# Patient Record
Sex: FEMALE | Race: WHITE | NOT HISPANIC OR LATINO | Employment: FULL TIME | URBAN - METROPOLITAN AREA
[De-identification: names, ages, dates, MRNs, and addresses within clinical notes are randomized per-mention and may not be internally consistent; named-entity substitution may affect disease eponyms.]

---

## 2019-06-28 ENCOUNTER — OFFICE VISIT (OUTPATIENT)
Dept: URGENT CARE | Facility: CLINIC | Age: 34
End: 2019-06-28
Payer: COMMERCIAL

## 2019-06-28 VITALS
WEIGHT: 131 LBS | SYSTOLIC BLOOD PRESSURE: 125 MMHG | OXYGEN SATURATION: 99 % | HEIGHT: 65 IN | RESPIRATION RATE: 16 BRPM | TEMPERATURE: 99 F | DIASTOLIC BLOOD PRESSURE: 84 MMHG | BODY MASS INDEX: 21.83 KG/M2 | HEART RATE: 104 BPM

## 2019-06-28 DIAGNOSIS — T14.8XXA ABRASION: ICD-10-CM

## 2019-06-28 DIAGNOSIS — S71.152A DOG BITE OF LEFT THIGH, INITIAL ENCOUNTER: Primary | ICD-10-CM

## 2019-06-28 DIAGNOSIS — S70.12XA CONTUSION OF LEFT THIGH, INITIAL ENCOUNTER: ICD-10-CM
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
## 2019-06-28 PROCEDURE — 99203 PR OFFICE/OUTPT VISIT, NEW, LEVL III, 30-44 MIN: ICD-10-PCS | Mod: S$GLB,,, | Performed by: EMERGENCY MEDICINE

## 2019-06-28 PROCEDURE — 3008F PR BODY MASS INDEX (BMI) DOCUMENTED: ICD-10-PCS | Mod: CPTII,S$GLB,, | Performed by: EMERGENCY MEDICINE

## 2019-06-28 PROCEDURE — 99203 OFFICE O/P NEW LOW 30 MIN: CPT | Mod: S$GLB,,, | Performed by: EMERGENCY MEDICINE

## 2019-06-28 PROCEDURE — 3008F BODY MASS INDEX DOCD: CPT | Mod: CPTII,S$GLB,, | Performed by: EMERGENCY MEDICINE

## 2019-06-28 RX ORDER — MUPIROCIN 20 MG/G
OINTMENT TOPICAL
Qty: 22 G | Refills: 1 | Status: SHIPPED | OUTPATIENT
Start: 2019-06-28 | End: 2022-11-08 | Stop reason: ALTCHOICE

## 2019-06-28 RX ORDER — AMOXICILLIN AND CLAVULANATE POTASSIUM 875; 125 MG/1; MG/1
1 TABLET, FILM COATED ORAL EVERY 12 HOURS
Qty: 20 TABLET | Refills: 0 | Status: SHIPPED | OUTPATIENT
Start: 2019-06-28 | End: 2019-07-08

## 2019-06-29 NOTE — PROGRESS NOTES
"Subjective:       Patient ID: Patricia Quezada is a 33 y.o. female.    Vitals:  height is 5' 5" (1.651 m) and weight is 59.4 kg (131 lb). Her tympanic temperature is 98.8 °F (37.1 °C). Her blood pressure is 125/84 and her pulse is 104. Her respiration is 16 and oxygen saturation is 99%.     Chief Complaint: Animal Bite (left leg )    Pt states she was out of a walk today and nipped her under her buttock to left leg. Pt is UTD on tetanus     It was a black lab.  It did not cause bleeding.  Did cause significant abrasion and bruising and contusion of the left posterolateral thigh where the bite site was.  Barely broke the skin with abrasions.  Slightly tender.  No other trauma.  Tetanus shot was 2-3 years ago.  Dogs had collars.    Animal Bite    The incident occurred today. There is an injury to the left thigh. The pain is mild. Pertinent negatives include no chest pain, no nausea, no vomiting, no headaches, no light-headedness, no weakness and no cough. Her tetanus status is UTD.       Constitution: Negative for chills, fatigue and fever.   HENT: Negative for congestion and sore throat.    Neck: Negative for painful lymph nodes.   Cardiovascular: Negative for chest pain and leg swelling.   Eyes: Negative for double vision and blurred vision.   Respiratory: Negative for cough and shortness of breath.    Gastrointestinal: Negative for nausea, vomiting and diarrhea.   Genitourinary: Negative for dysuria, frequency, urgency and history of kidney stones.   Musculoskeletal: Negative for joint pain, joint swelling, muscle cramps and muscle ache.   Skin: Negative for color change, pale, rash, erythema and bruising.   Allergic/Immunologic: Negative for seasonal allergies.   Neurological: Negative for dizziness, history of vertigo, light-headedness, passing out and headaches.   Hematologic/Lymphatic: Negative for swollen lymph nodes.   Psychiatric/Behavioral: Negative for nervous/anxious, sleep disturbance and " depression. The patient is not nervous/anxious.        Objective:      Physical Exam   Constitutional: She is oriented to person, place, and time. She appears well-developed and well-nourished.   HENT:   Head: Normocephalic and atraumatic. Head is without abrasion, without contusion and without laceration.   Eyes: Pupils are equal, round, and reactive to light. Conjunctivae, EOM and lids are normal.   Neck: Trachea normal, full passive range of motion without pain and phonation normal. Neck supple.   Cardiovascular: Normal rate, regular rhythm and normal heart sounds.   Pulmonary/Chest: Effort normal and breath sounds normal. No stridor. No respiratory distress.   Musculoskeletal: Normal range of motion.   Neurological: She is alert and oriented to person, place, and time.   Skin: Skin is warm, dry and intact. Capillary refill takes less than 2 seconds. No abrasion, no bruising, no burn, no ecchymosis, no laceration, no lesion and no rash noted. No erythema.   3 cm area of swelling and contusion and some very superficial abrasions and almost puncture wounds from the dog bite to the left posterolateral thigh.  No laceration no deep punctures no bleeding.  Mildly tender to palpation   Psychiatric: She has a normal mood and affect. Her speech is normal. Cognition and memory are normal.   Nursing note and vitals reviewed.      Assessment:       1. Dog bite of left thigh, initial encounter    2. Contusion of left thigh, initial encounter    3. Abrasion        Plan:         Dog bite of left thigh, initial encounter    Contusion of left thigh, initial encounter    Abrasion    Other orders  -     mupirocin (BACTROBAN) 2 % ointment; Apply to affected area 3 times daily  Dispense: 22 g; Refill: 1  -     amoxicillin-clavulanate 875-125mg (AUGMENTIN) 875-125 mg per tablet; Take 1 tablet by mouth every 12 (twelve) hours. for 10 days  Dispense: 20 tablet; Refill: 0          Patient Instructions     Tetanus shot already  up-to-date  Wash the site with antibacterial soap and water like dial soap.  Ibuprofen 600 mg every 6-8 hours as needed for pain  Bactroban ointment 2-3 times daily  If you are having signs of cellulitis or skin infection that is worsening despite treatment regimen above, fill and take the Augmentin prescription provided for you.    Review dog bite sheet  Review contusion sheet  Review abrasion sheet.    Return here for any concerns or problems  Lower Extremity Contusion  You have a contusion (bruise) of a lower extremity (leg, knee, ankle, foot, or toe). Symptoms include pain, swelling, and skin discoloration. No bones are broken. This injury may take from a few days to a few weeks to heal.  During that time, the bruise may change from reddish in color, to purple-blue, to green-yellow, to yellow-brown.  Home care  · Unless another medicine was prescribed, you can take acetaminophen, ibuprofen, or naproxen to control pain. (If you have chronic liver or kidney disease or ever had a stomach ulcer or gastrointestinal bleeding, talk with your doctor before using these medicines.)  · Elevate the injured area to reduce pain and swelling. As much as possible, sit or lie down with the injured area raised about the level of your heart. This is especially important during the first 48 hours.  · Ice the injured area to help reduce pain and swelling. Wrap a cold source (ice pack or ice cubes in a plastic bag) in a thin towel. Apply to the bruised area for 20 minutes every 1 to 2 hours the first day. Continue this 3 to 4 times a day until the pain and swelling goes away.  · If crutches have been advised, do not bear full weight on the injured leg until you can do so without pain. You may return to sports when you are able to put full weight and impact on the injured leg without pain.  Follow up  Follow up with your healthcare provider or our staff as advised. Call if you are not improving within the next 1 to 2 weeks.  When to  seek medical advice   Call your healthcare provider right away if any of these occur:  · Increased pain or swelling  · Foot or toes become cold, blue, numb or tingly  · Signs of infection: Warmth, drainage, or increased redness or pain around the injury  · Inability to move the injured area   · Frequent bruising for unknown reasons  Date Last Reviewed: 2/1/2017 © 2000-2017 [employer]. [address] [place] [address]. All rights reserved. This information is not intended as a substitute for professional medical care. Always follow your healthcare professional's instructions.        Dog Bite  A dog bite can cause a wound deep enough to break the skin. In such cases, the wound is cleaned and sometimes closed. If the wound is closed, it is usually not completely closed. This is so that fluid can drain if the wound becomes infected. Often, wounds will be left open to heal. In addition to wound care, a tetanus shot may be given, if needed.    Home care  · Wash your hands well with soap and warm water before and after caring for the wound. This helps lower the risk of infection.  · Care for the wound as directed. If a dressing was applied to the wound, be sure to change it as directed.  · If the wound bleeds, place a clean, soft cloth on the wound. Then firmly apply pressure until the bleeding stops. This may take up to 5 minutes. Do not release the pressure and look at the wound during this time.  · Most wounds heal within 10 days. But an infection can occur even with proper treatment. So be sure to check the wound daily for signs of infection (see below).  · Antibiotics may be prescribed. These help prevent or treat infection. If youre given antibiotics, take them as directed. Also be sure to complete the medicines.  Rabies prevention  Rabies is a virus that can be carried in certain animals. These can include domestic animals such as dogs and cats. Pets fully vaccinated against rabies (2  shots) are at very low risk of infection. But because human rabies is almost always fatal, any biting pet should be confined for 10 days as an extra precaution. In general, if there is a risk for rabies, the following steps may need to be taken:  · If someones pet dog has bitten you, it should be kept in a secure area for the next 10 days to watch for signs of illness. (If the pet owner wont allow this, contact your local animal control center.) If the dog becomes ill or dies during that time, contact your local animal control center at once so the animal may be tested for rabies. If the dog stays healthy for the next 10 days, there is no danger of rabies in the animal or you.  ¨ If a stray dog bit you, contact your local animal control center. They can give information on capture, quarantine, and animal rabies testing.  ¨ If you cant find the animal that bit you in the next 2 days, and if rabies exists in your area, you may need to receive the rabies vaccine series. Call your healthcare provider right away. Or, return to the emergency department promptly.  ¨ All animal bites should be reported to the local animal control center. If you were not given a form to fill out, you can report this yourself.  Follow-up care  Follow up with your healthcare provider, or as directed.  When to seek medical advice  Call your healthcare provider right away if any of these occur:  · Signs of infection:  ¨ Spreading redness or warmth from the wound  ¨ Increased pain or swelling  ¨ Fever of 100.4ºF (38ºC) or higher, or as directed by your healthcare provider  ¨ Colored fluid or pus draining from the wound  · Signs of rabies infection:  ¨ Headache  ¨ Confusion  ¨ Strange behavior  ¨ Increased salivating and drooling  ¨ Seizure  · Decreased ability to move any body part near the wound  · Bleeding that can't be stopped after 5 minutes of firm pressure  Date Last Reviewed: 3/1/2017  © 1845-1522 [employer]. [address] [place] [address]  [place] [address]. All rights reserved. This information is not intended as a substitute for professional medical care. Always follow your healthcare professional's instructions.        Bruises (Contusions)    A contusion is a bruise. A bruise happens when a blow to your body doesn't break the skin but does break blood vessels beneath the skin. Blood leaking from the broken vessels causes redness and swelling. As it heals, your bruise is likely to turn colors like purple, green, and yellow. This is normal. The bruise should fade in 2 or 3 weeks.  Factors that make you more likely to bruise  Almost everyone bruises now and then. Certain people do bruise more easily than others. You're more prone to bruising as you get older. That's because blood vessels become more fragile with age. You're also more likely to bruise if you have a clotting disorder such as hemophilia or take medications that reduce clotting, including aspirin, coumadin, newer agents.  When to go to the emergency room (ER)  Bruises almost always heal on their own without special treatment. But for some people, a bad bruise can be serious. Seek medical care if you:  · Have a clotting disorder such as hemophilia.  · Have cirrhosis or other serious liver disease.  · Take blood-thinning medications such as warfarin (Coumadin).  What to expect in the ER  A doctor will examine your bruise and ask about any health conditions you have. In some cases, you may have a test to check how well your blood clots. Other treatment will depend on your needs.  Follow-up care  Sometimes a bruise gets worse instead of better. It may become larger and more swollen. This can occur when your body walls off a small pool of blood under the skin (hematoma). In very rare cases, your doctor may need to drain excess blood from the area.  Tip:  Apply an ice pack or bag of frozen peas to a bruise (keep a thin cloth between the cold source and your skin). This can help  reduce redness and swelling.   Date Last Reviewed: 12/1/2016  © 1675-9237 [employer]. [address] [place] [address], [place] [address]. All rights reserved. This information is not intended as a substitute for professional medical care. Always follow your healthcare professional's instructions.

## 2019-06-29 NOTE — PATIENT INSTRUCTIONS
Tetanus shot already up-to-date  Wash the site with antibacterial soap and water like dial soap.  Ibuprofen 600 mg every 6-8 hours as needed for pain  Bactroban ointment 2-3 times daily  If you are having signs of cellulitis or skin infection that is worsening despite treatment regimen above, fill and take the Augmentin prescription provided for you.    Review dog bite sheet  Review contusion sheet  Review abrasion sheet.    Return here for any concerns or problems  Lower Extremity Contusion  You have a contusion (bruise) of a lower extremity (leg, knee, ankle, foot, or toe). Symptoms include pain, swelling, and skin discoloration. No bones are broken. This injury may take from a few days to a few weeks to heal.  During that time, the bruise may change from reddish in color, to purple-blue, to green-yellow, to yellow-brown.  Home care  · Unless another medicine was prescribed, you can take acetaminophen, ibuprofen, or naproxen to control pain. (If you have chronic liver or kidney disease or ever had a stomach ulcer or gastrointestinal bleeding, talk with your doctor before using these medicines.)  · Elevate the injured area to reduce pain and swelling. As much as possible, sit or lie down with the injured area raised about the level of your heart. This is especially important during the first 48 hours.  · Ice the injured area to help reduce pain and swelling. Wrap a cold source (ice pack or ice cubes in a plastic bag) in a thin towel. Apply to the bruised area for 20 minutes every 1 to 2 hours the first day. Continue this 3 to 4 times a day until the pain and swelling goes away.  · If crutches have been advised, do not bear full weight on the injured leg until you can do so without pain. You may return to sports when you are able to put full weight and impact on the injured leg without pain.  Follow up  Follow up with your healthcare provider or our staff as advised. Call if you are not improving within the next 1  to 2 weeks.  When to seek medical advice   Call your healthcare provider right away if any of these occur:  · Increased pain or swelling  · Foot or toes become cold, blue, numb or tingly  · Signs of infection: Warmth, drainage, or increased redness or pain around the injury  · Inability to move the injured area   · Frequent bruising for unknown reasons  Date Last Reviewed: 2/1/2017 © 2000-2017 [employer]. [address] [place] [address], [place] [address]. All rights reserved. This information is not intended as a substitute for professional medical care. Always follow your healthcare professional's instructions.        Dog Bite  A dog bite can cause a wound deep enough to break the skin. In such cases, the wound is cleaned and sometimes closed. If the wound is closed, it is usually not completely closed. This is so that fluid can drain if the wound becomes infected. Often, wounds will be left open to heal. In addition to wound care, a tetanus shot may be given, if needed.    Home care  · Wash your hands well with soap and warm water before and after caring for the wound. This helps lower the risk of infection.  · Care for the wound as directed. If a dressing was applied to the wound, be sure to change it as directed.  · If the wound bleeds, place a clean, soft cloth on the wound. Then firmly apply pressure until the bleeding stops. This may take up to 5 minutes. Do not release the pressure and look at the wound during this time.  · Most wounds heal within 10 days. But an infection can occur even with proper treatment. So be sure to check the wound daily for signs of infection (see below).  · Antibiotics may be prescribed. These help prevent or treat infection. If youre given antibiotics, take them as directed. Also be sure to complete the medicines.  Rabies prevention  Rabies is a virus that can be carried in certain animals. These can include domestic animals such as dogs and cats. Pets fully vaccinated  against rabies (2 shots) are at very low risk of infection. But because human rabies is almost always fatal, any biting pet should be confined for 10 days as an extra precaution. In general, if there is a risk for rabies, the following steps may need to be taken:  · If someones pet dog has bitten you, it should be kept in a secure area for the next 10 days to watch for signs of illness. (If the pet owner wont allow this, contact your local animal control center.) If the dog becomes ill or dies during that time, contact your local animal control center at once so the animal may be tested for rabies. If the dog stays healthy for the next 10 days, there is no danger of rabies in the animal or you.  ¨ If a stray dog bit you, contact your local animal control center. They can give information on capture, quarantine, and animal rabies testing.  ¨ If you cant find the animal that bit you in the next 2 days, and if rabies exists in your area, you may need to receive the rabies vaccine series. Call your healthcare provider right away. Or, return to the emergency department promptly.  ¨ All animal bites should be reported to the local animal control center. If you were not given a form to fill out, you can report this yourself.  Follow-up care  Follow up with your healthcare provider, or as directed.  When to seek medical advice  Call your healthcare provider right away if any of these occur:  · Signs of infection:  ¨ Spreading redness or warmth from the wound  ¨ Increased pain or swelling  ¨ Fever of 100.4ºF (38ºC) or higher, or as directed by your healthcare provider  ¨ Colored fluid or pus draining from the wound  · Signs of rabies infection:  ¨ Headache  ¨ Confusion  ¨ Strange behavior  ¨ Increased salivating and drooling  ¨ Seizure  · Decreased ability to move any body part near the wound  · Bleeding that can't be stopped after 5 minutes of firm pressure  Date Last Reviewed: 3/1/2017  © 0525-2936 [employer] [address] [place] [address]. All rights reserved. This information is not intended as a substitute for professional medical care. Always follow your healthcare professional's instructions.        Bruises (Contusions)    A contusion is a bruise. A bruise happens when a blow to your body doesn't break the skin but does break blood vessels beneath the skin. Blood leaking from the broken vessels causes redness and swelling. As it heals, your bruise is likely to turn colors like purple, green, and yellow. This is normal. The bruise should fade in 2 or 3 weeks.  Factors that make you more likely to bruise  Almost everyone bruises now and then. Certain people do bruise more easily than others. You're more prone to bruising as you get older. That's because blood vessels become more fragile with age. You're also more likely to bruise if you have a clotting disorder such as hemophilia or take medications that reduce clotting, including aspirin, coumadin, newer agents.  When to go to the emergency room (ER)  Bruises almost always heal on their own without special treatment. But for some people, a bad bruise can be serious. Seek medical care if you:  · Have a clotting disorder such as hemophilia.  · Have cirrhosis or other serious liver disease.  · Take blood-thinning medications such as warfarin (Coumadin).  What to expect in the ER  A doctor will examine your bruise and ask about any health conditions you have. In some cases, you may have a test to check how well your blood clots. Other treatment will depend on your needs.  Follow-up care  Sometimes a bruise gets worse instead of better. It may become larger and more swollen. This can occur when your body walls off a small pool of blood under the skin (hematoma). In very rare cases, your doctor may need to drain excess blood from the area.  Tip:  Apply an ice pack or bag of frozen peas to a bruise (keep a thin cloth between the cold source and your skin).  This can help reduce redness and swelling.   Date Last Reviewed: 12/1/2016  © 6745-9642 [employer]. [address] [place] [address], [place] [address]. All rights reserved. This information is not intended as a substitute for professional medical care. Always follow your healthcare professional's instructions.

## 2020-11-16 ENCOUNTER — OFFICE VISIT (OUTPATIENT)
Dept: URGENT CARE | Facility: CLINIC | Age: 35
End: 2020-11-16
Payer: COMMERCIAL

## 2020-11-16 VITALS
RESPIRATION RATE: 18 BRPM | HEIGHT: 65 IN | BODY MASS INDEX: 21.66 KG/M2 | TEMPERATURE: 99 F | DIASTOLIC BLOOD PRESSURE: 80 MMHG | HEART RATE: 66 BPM | WEIGHT: 130 LBS | SYSTOLIC BLOOD PRESSURE: 122 MMHG | OXYGEN SATURATION: 98 %

## 2020-11-16 DIAGNOSIS — S16.1XXA STRAIN OF NECK MUSCLE, INITIAL ENCOUNTER: ICD-10-CM

## 2020-11-16 DIAGNOSIS — S46.912A STRAIN OF LEFT SHOULDER, INITIAL ENCOUNTER: ICD-10-CM
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
## 2020-11-16 PROCEDURE — 3008F PR BODY MASS INDEX (BMI) DOCUMENTED: ICD-10-PCS | Mod: CPTII,S$GLB,, | Performed by: EMERGENCY MEDICINE

## 2020-11-16 PROCEDURE — 99214 PR OFFICE/OUTPT VISIT, EST, LEVL IV, 30-39 MIN: ICD-10-PCS | Mod: S$GLB,,, | Performed by: EMERGENCY MEDICINE

## 2020-11-16 PROCEDURE — 3008F BODY MASS INDEX DOCD: CPT | Mod: CPTII,S$GLB,, | Performed by: EMERGENCY MEDICINE

## 2020-11-16 PROCEDURE — 99214 OFFICE O/P EST MOD 30 MIN: CPT | Mod: S$GLB,,, | Performed by: EMERGENCY MEDICINE

## 2020-11-16 RX ORDER — METHOCARBAMOL 500 MG/1
1000 TABLET, FILM COATED ORAL 4 TIMES DAILY
Qty: 56 TABLET | Refills: 0 | Status: SHIPPED | OUTPATIENT
Start: 2020-11-16 | End: 2020-11-23
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
## 2020-11-16 NOTE — LETTER
November 16, 2020      [facility] [address] [place]  [facility] [address]  Phone: [phone]  Fax: [phone]       Patient: Patricia Quezada   YOB: 1985  Date of Visit: 11/16/2020    To Whom It May Concern:    Ervin Quezada  was at [facility] on 11/16/2020. She may return to work/school on 11/17/20 with restrictions.     No Lifting > 10 pounds, Bending, or Straining for the next 7 days      If you have any questions or concerns, or if I can be of further assistance, please do not hesitate to contact me.    Sincerely,    Albert Layne III, MD

## 2021-04-16 ENCOUNTER — PATIENT MESSAGE (OUTPATIENT)
Dept: RESEARCH | Facility: HOSPITAL | Age: 36
End: 2021-04-16

## 2022-11-08 ENCOUNTER — OFFICE VISIT (OUTPATIENT)
Dept: PRIMARY CARE CLINIC | Facility: CLINIC | Age: 37
End: 2022-11-08
Payer: COMMERCIAL

## 2022-11-08 VITALS
BODY MASS INDEX: 24.68 KG/M2 | OXYGEN SATURATION: 97 % | DIASTOLIC BLOOD PRESSURE: 74 MMHG | WEIGHT: 148.13 LBS | SYSTOLIC BLOOD PRESSURE: 120 MMHG | HEART RATE: 84 BPM | HEIGHT: 65 IN | TEMPERATURE: 99 F

## 2022-11-08 DIAGNOSIS — Z12.4 PAP SMEAR FOR CERVICAL CANCER SCREENING: Primary | ICD-10-CM

## 2022-11-08 DIAGNOSIS — N63.23 MASS OF LOWER OUTER QUADRANT OF LEFT BREAST: ICD-10-CM

## 2022-11-08 PROCEDURE — 99999 PR PBB SHADOW E&M-EST. PATIENT-LVL IV: ICD-10-PCS | Mod: PBBFAC,,, | Performed by: STUDENT IN AN ORGANIZED HEALTH CARE EDUCATION/TRAINING PROGRAM

## 2022-11-08 PROCEDURE — 3078F PR MOST RECENT DIASTOLIC BLOOD PRESSURE < 80 MM HG: ICD-10-PCS | Mod: CPTII,S$GLB,, | Performed by: STUDENT IN AN ORGANIZED HEALTH CARE EDUCATION/TRAINING PROGRAM

## 2022-11-08 PROCEDURE — 99212 OFFICE O/P EST SF 10 MIN: CPT | Mod: S$GLB,,, | Performed by: STUDENT IN AN ORGANIZED HEALTH CARE EDUCATION/TRAINING PROGRAM

## 2022-11-08 PROCEDURE — 1159F MED LIST DOCD IN RCRD: CPT | Mod: CPTII,S$GLB,, | Performed by: STUDENT IN AN ORGANIZED HEALTH CARE EDUCATION/TRAINING PROGRAM

## 2022-11-08 PROCEDURE — 3008F PR BODY MASS INDEX (BMI) DOCUMENTED: ICD-10-PCS | Mod: CPTII,S$GLB,, | Performed by: STUDENT IN AN ORGANIZED HEALTH CARE EDUCATION/TRAINING PROGRAM

## 2022-11-08 PROCEDURE — 99999 PR PBB SHADOW E&M-EST. PATIENT-LVL IV: CPT | Mod: PBBFAC,,, | Performed by: STUDENT IN AN ORGANIZED HEALTH CARE EDUCATION/TRAINING PROGRAM

## 2022-11-08 PROCEDURE — 3078F DIAST BP <80 MM HG: CPT | Mod: CPTII,S$GLB,, | Performed by: STUDENT IN AN ORGANIZED HEALTH CARE EDUCATION/TRAINING PROGRAM

## 2022-11-08 PROCEDURE — 3008F BODY MASS INDEX DOCD: CPT | Mod: CPTII,S$GLB,, | Performed by: STUDENT IN AN ORGANIZED HEALTH CARE EDUCATION/TRAINING PROGRAM

## 2022-11-08 PROCEDURE — 3074F SYST BP LT 130 MM HG: CPT | Mod: CPTII,S$GLB,, | Performed by: STUDENT IN AN ORGANIZED HEALTH CARE EDUCATION/TRAINING PROGRAM

## 2022-11-08 PROCEDURE — 99212 PR OFFICE/OUTPT VISIT, EST, LEVL II, 10-19 MIN: ICD-10-PCS | Mod: S$GLB,,, | Performed by: STUDENT IN AN ORGANIZED HEALTH CARE EDUCATION/TRAINING PROGRAM

## 2022-11-08 PROCEDURE — 3074F PR MOST RECENT SYSTOLIC BLOOD PRESSURE < 130 MM HG: ICD-10-PCS | Mod: CPTII,S$GLB,, | Performed by: STUDENT IN AN ORGANIZED HEALTH CARE EDUCATION/TRAINING PROGRAM

## 2022-11-08 PROCEDURE — 1159F PR MEDICATION LIST DOCUMENTED IN MEDICAL RECORD: ICD-10-PCS | Mod: CPTII,S$GLB,, | Performed by: STUDENT IN AN ORGANIZED HEALTH CARE EDUCATION/TRAINING PROGRAM

## 2022-11-08 NOTE — PROGRESS NOTES
"  Primary Care  Return/Acute Office Visit - In Person  11/8/2022  Marzena Ndhlovu      HPI    Patient is a 36 y.o.   Patricia Quezada  has a past medical history of ALL (acute lymphoid leukemia) in remission (1990).    Patient presents with   Chief Complaint   Patient presents with    Breast Mass     Right, tender to touch       Patient presenting with left breast mass  First noticed in September and seems to have become smaller. It does not seem to coincide with menstruation. She denies any skin changes or nipple discharge. Patient has no family hx of breast cancer.     Social History     Social History Narrative    Not on file     Patricia Quezada family history includes Diabetes in her father.    Active Medications:  Review of patient's allergies indicates:  No Known Allergies  No current outpatient medications      Review of Systems   All other systems reviewed and are negative.    Vitals:    11/08/22 1552   BP: 120/74   BP Location: Right arm   Pulse: 84   Temp: 98.6 °F (37 °C)   SpO2: 97%   Weight: 67.2 kg (148 lb 2.4 oz)   Height: 5' 5" (1.651 m)       Physical Exam  Chest:   Breasts:     Left: Mass present.            Assessment and Plan     Left breast mass   ~1.5 cm mass palpated RLQ of right breast.   -Mammogram     Screening   Referral to GYN for pap smear    Vaccines   Schedule COVID booster            Orders Placed This Visit  Orders Placed This Encounter   Procedures    Mammo Digital Diagnostic Bilat     Standing Status:   Future     Standing Expiration Date:   11/8/2024     Order Specific Question:   May the Radiologist modify the order per protocol to meet the clinical needs of the patient?     Answer:   Yes     Order Specific Question:   Release to patient     Answer:   Immediate    Ambulatory referral/consult to Gynecology     Standing Status:   Future     Standing Expiration Date:   12/8/2023     Referral Priority:   Routine     Referral Type:   Consultation     Referral Reason: "   Specialty Services Required     Requested Specialty:   Gynecology     Number of Visits Requested:   1           Upcoming Scheduled Appointments and Follow Up:    No future appointments.    Follow Up [facility] (with who? when?): No follow-ups on file.      Extended Emergency Contact Information  Primary Emergency Contact: Taqueria Christensen   [place]  Mobile Phone: [phone]  Relation: Relative      Marzena Bey MD   Attending Physician  Primary Care  11/8/2022 - 4:05 PM    I spent a total of 13 minutes on the day of the visit.This includes face to face time and non-face to face time preparing to see the patient (eg, review of tests), obtaining and/or reviewing separately obtained history, documenting clinical information in the electronic or other health record, independently interpreting results and communicating results to the patient/family/caregiver, or care coordinator.

## 2022-11-09 ENCOUNTER — PATIENT MESSAGE (OUTPATIENT)
Dept: RADIOLOGY | Facility: HOSPITAL | Age: 37
End: 2022-11-09
Payer: COMMERCIAL

## 2022-11-09 ENCOUNTER — TELEPHONE (OUTPATIENT)
Dept: RADIOLOGY | Facility: HOSPITAL | Age: 37
End: 2022-11-09
Payer: COMMERCIAL

## 2022-11-16 ENCOUNTER — HOSPITAL ENCOUNTER (OUTPATIENT)
Dept: RADIOLOGY | Facility: HOSPITAL | Age: 37
Discharge: HOME | End: 2022-11-16
Attending: STUDENT IN AN ORGANIZED HEALTH CARE EDUCATION/TRAINING PROGRAM
Payer: COMMERCIAL

## 2022-11-16 VITALS — HEIGHT: 65 IN | BODY MASS INDEX: 24.99 KG/M2 | WEIGHT: 150 LBS

## 2022-11-16 DIAGNOSIS — N63.23 MASS OF LOWER OUTER QUADRANT OF LEFT BREAST: ICD-10-CM

## 2022-11-16 PROCEDURE — 77066 DX MAMMO INCL CAD BI: CPT | Mod: TC

## 2022-11-16 PROCEDURE — 76642 US BREAST RIGHT LIMITED: ICD-10-PCS | Mod: 26,RT,, | Performed by: RADIOLOGY

## 2022-11-16 PROCEDURE — 77062 MAMMO DIGITAL DIAGNOSTIC BILAT WITH TOMO: ICD-10-PCS | Mod: 26,,, | Performed by: RADIOLOGY

## 2022-11-16 PROCEDURE — 77066 DX MAMMO INCL CAD BI: CPT | Mod: 26,,, | Performed by: RADIOLOGY

## 2022-11-16 PROCEDURE — 76642 ULTRASOUND BREAST LIMITED: CPT | Mod: TC,RT

## 2022-11-16 PROCEDURE — 76642 ULTRASOUND BREAST LIMITED: CPT | Mod: 26,RT,, | Performed by: RADIOLOGY

## 2022-11-16 PROCEDURE — 77066 MAMMO DIGITAL DIAGNOSTIC BILAT WITH TOMO: ICD-10-PCS | Mod: 26,,, | Performed by: RADIOLOGY

## 2022-11-16 PROCEDURE — 77062 BREAST TOMOSYNTHESIS BI: CPT | Mod: 26,,, | Performed by: RADIOLOGY

## 2022-11-17 ENCOUNTER — TELEPHONE (OUTPATIENT)
Dept: RADIOLOGY | Facility: HOSPITAL | Age: 37
End: 2022-11-17
Payer: COMMERCIAL

## 2022-11-17 NOTE — TELEPHONE ENCOUNTER
Spoke with patient. Reviewed breast biopsy procedure and reviewed instructions for breast biopsy. Patient expressed understanding and all questions were answered. Provided patient with my phone number to call for any further concerns or questions.   Patient scheduled breast biopsy at the [facility] on 12/12/2022.

## 2022-12-12 ENCOUNTER — HOSPITAL ENCOUNTER (OUTPATIENT)
Dept: RADIOLOGY | Facility: HOSPITAL | Age: 37
Discharge: HOME | End: 2022-12-12
Attending: STUDENT IN AN ORGANIZED HEALTH CARE EDUCATION/TRAINING PROGRAM
Payer: COMMERCIAL

## 2022-12-12 DIAGNOSIS — R92.8 ABNORMAL FINDING ON BREAST IMAGING: ICD-10-CM

## 2022-12-12 DIAGNOSIS — N63.0 BREAST MASS IN FEMALE: ICD-10-CM

## 2022-12-12 PROCEDURE — 77065 MAMMO DIGITAL DIAGNOSTIC RIGHT: ICD-10-PCS | Mod: 26,RT,, | Performed by: RADIOLOGY

## 2022-12-12 PROCEDURE — 88305 TISSUE EXAM BY PATHOLOGIST: CPT | Performed by: PATHOLOGY

## 2022-12-12 PROCEDURE — 77065 DX MAMMO INCL CAD UNI: CPT | Mod: TC,RT

## 2022-12-12 PROCEDURE — 77065 DX MAMMO INCL CAD UNI: CPT | Mod: 26,RT,, | Performed by: RADIOLOGY

## 2022-12-12 PROCEDURE — 27200937 US BREAST BIOPSY WITH IMAGING 1ST SITE RIGHT

## 2022-12-12 PROCEDURE — 88305 TISSUE EXAM BY PATHOLOGIST: ICD-10-PCS | Mod: 26,,, | Performed by: PATHOLOGY

## 2022-12-12 PROCEDURE — A4648 IMPLANTABLE TISSUE MARKER: HCPCS

## 2022-12-12 PROCEDURE — 25000003 PHARM REV CODE 250: Performed by: STUDENT IN AN ORGANIZED HEALTH CARE EDUCATION/TRAINING PROGRAM

## 2022-12-12 PROCEDURE — 88305 TISSUE EXAM BY PATHOLOGIST: CPT | Mod: 26,,, | Performed by: PATHOLOGY

## 2022-12-12 PROCEDURE — 19083 US BREAST BIOPSY WITH IMAGING 1ST SITE RIGHT: ICD-10-PCS | Mod: RT,,, | Performed by: RADIOLOGY

## 2022-12-12 PROCEDURE — 19083 BX BREAST 1ST LESION US IMAG: CPT | Mod: RT,,, | Performed by: RADIOLOGY

## 2022-12-12 RX ORDER — BUPIVACAINE HYDROCHLORIDE 2.5 MG/ML
10 INJECTION, SOLUTION EPIDURAL; INFILTRATION; INTRACAUDAL ONCE
Status: COMPLETED | OUTPATIENT
Start: 2022-12-12 | End: 2022-12-12

## 2022-12-12 RX ORDER — LIDOCAINE HYDROCHLORIDE 10 MG/ML
5 INJECTION, SOLUTION EPIDURAL; INFILTRATION; INTRACAUDAL; PERINEURAL ONCE
Status: COMPLETED | OUTPATIENT
Start: 2022-12-12 | End: 2022-12-12

## 2022-12-12 RX ADMIN — LIDOCAINE HYDROCHLORIDE 30 MG: 10 INJECTION, SOLUTION EPIDURAL; INFILTRATION; INTRACAUDAL; PERINEURAL at 03:12

## 2022-12-12 RX ADMIN — BUPIVACAINE HYDROCHLORIDE 10 ML: 2.5 INJECTION, SOLUTION EPIDURAL; INFILTRATION; INTRACAUDAL; PERINEURAL at 03:12

## 2022-12-14 LAB
COMMENT: NORMAL
FINAL PATHOLOGIC DIAGNOSIS: NORMAL
GROSS: NORMAL
Lab: NORMAL

## 2022-12-16 ENCOUNTER — TELEPHONE (OUTPATIENT)
Dept: SURGERY | Facility: CLINIC | Age: 37
End: 2022-12-16
Payer: COMMERCIAL

## 2022-12-16 NOTE — TELEPHONE ENCOUNTER
Attempted to contact patient regarding recent breast biopsy. Patient did not answer, message left with my name and direct number for patient to contact back.

## 2022-12-16 NOTE — TELEPHONE ENCOUNTER
Patient called with results of breast biopsy from 12/12/2022,   no atypia/benign, all questions answered, pt advised to follow up in 6 months with repeat imaging per core biopsy protocol.  reviewed biopsy results and results are benign and concordant. Patient verbalized understanding.         ----- Message from Shauna Edmonds MD sent at 12/15/2022  1:00 PM CST -----  Benign and concordant.

## 2022-12-16 NOTE — TELEPHONE ENCOUNTER
Returned patient call, questions answered.      ----- Message from Steffen Burciaga sent at 12/16/2022  3:38 PM CST -----  Contact: [phone]  Pt is calling back from a missed call.  Pt would like a call back at your earliest convenience.  Pt can be reached at. [phone]

## 2023-03-02 ENCOUNTER — PATIENT OUTREACH (OUTPATIENT)
Dept: ADMINISTRATIVE | Facility: HOSPITAL | Age: 38
End: 2023-03-02
Payer: COMMERCIAL

## 2023-03-02 NOTE — PROGRESS NOTES
Health Maintenance Due   Topic Date Due    Hepatitis C Screening  Never done    COVID-19 Vaccine (4 - Booster for Pfizer series) 01/09/2022        Chart review done.   HM updated.   Immunizations reviewed & updated.   Care Everywhere updated.   [facility] reviewed

## 2023-03-06 ENCOUNTER — OFFICE VISIT (OUTPATIENT)
Dept: PRIMARY CARE CLINIC | Facility: CLINIC | Age: 38
End: 2023-03-06
Payer: COMMERCIAL

## 2023-03-06 VITALS
OXYGEN SATURATION: 99 % | SYSTOLIC BLOOD PRESSURE: 120 MMHG | HEART RATE: 101 BPM | WEIGHT: 153.44 LBS | HEIGHT: 65 IN | DIASTOLIC BLOOD PRESSURE: 76 MMHG | TEMPERATURE: 98 F | BODY MASS INDEX: 25.56 KG/M2

## 2023-03-06 DIAGNOSIS — M54.9 UPPER BACK PAIN ON LEFT SIDE: Primary | ICD-10-CM

## 2023-03-06 PROCEDURE — 3078F DIAST BP <80 MM HG: CPT | Mod: CPTII,S$GLB,, | Performed by: STUDENT IN AN ORGANIZED HEALTH CARE EDUCATION/TRAINING PROGRAM

## 2023-03-06 PROCEDURE — 99213 PR OFFICE/OUTPT VISIT, EST, LEVL III, 20-29 MIN: ICD-10-PCS | Mod: S$GLB,,, | Performed by: STUDENT IN AN ORGANIZED HEALTH CARE EDUCATION/TRAINING PROGRAM

## 2023-03-06 PROCEDURE — 3074F SYST BP LT 130 MM HG: CPT | Mod: CPTII,S$GLB,, | Performed by: STUDENT IN AN ORGANIZED HEALTH CARE EDUCATION/TRAINING PROGRAM

## 2023-03-06 PROCEDURE — 99999 PR PBB SHADOW E&M-EST. PATIENT-LVL IV: ICD-10-PCS | Mod: PBBFAC,,, | Performed by: STUDENT IN AN ORGANIZED HEALTH CARE EDUCATION/TRAINING PROGRAM

## 2023-03-06 PROCEDURE — 3078F PR MOST RECENT DIASTOLIC BLOOD PRESSURE < 80 MM HG: ICD-10-PCS | Mod: CPTII,S$GLB,, | Performed by: STUDENT IN AN ORGANIZED HEALTH CARE EDUCATION/TRAINING PROGRAM

## 2023-03-06 PROCEDURE — 3008F BODY MASS INDEX DOCD: CPT | Mod: CPTII,S$GLB,, | Performed by: STUDENT IN AN ORGANIZED HEALTH CARE EDUCATION/TRAINING PROGRAM

## 2023-03-06 PROCEDURE — 1159F MED LIST DOCD IN RCRD: CPT | Mod: CPTII,S$GLB,, | Performed by: STUDENT IN AN ORGANIZED HEALTH CARE EDUCATION/TRAINING PROGRAM

## 2023-03-06 PROCEDURE — 1159F PR MEDICATION LIST DOCUMENTED IN MEDICAL RECORD: ICD-10-PCS | Mod: CPTII,S$GLB,, | Performed by: STUDENT IN AN ORGANIZED HEALTH CARE EDUCATION/TRAINING PROGRAM

## 2023-03-06 PROCEDURE — 99213 OFFICE O/P EST LOW 20 MIN: CPT | Mod: S$GLB,,, | Performed by: STUDENT IN AN ORGANIZED HEALTH CARE EDUCATION/TRAINING PROGRAM

## 2023-03-06 PROCEDURE — 99999 PR PBB SHADOW E&M-EST. PATIENT-LVL IV: CPT | Mod: PBBFAC,,, | Performed by: STUDENT IN AN ORGANIZED HEALTH CARE EDUCATION/TRAINING PROGRAM

## 2023-03-06 PROCEDURE — 3008F PR BODY MASS INDEX (BMI) DOCUMENTED: ICD-10-PCS | Mod: CPTII,S$GLB,, | Performed by: STUDENT IN AN ORGANIZED HEALTH CARE EDUCATION/TRAINING PROGRAM

## 2023-03-06 PROCEDURE — 3074F PR MOST RECENT SYSTOLIC BLOOD PRESSURE < 130 MM HG: ICD-10-PCS | Mod: CPTII,S$GLB,, | Performed by: STUDENT IN AN ORGANIZED HEALTH CARE EDUCATION/TRAINING PROGRAM

## 2023-03-06 NOTE — PROGRESS NOTES
"Primary Care  Return/Acute Office Visit - In Person  3/6/2023  Marzena Ndhlovu      [facility]    Patient is a 37 y.o.   Patricia Quezada  has a past medical history of ALL (acute lymphoid leukemia) in remission (1990).    Patient presents with   Chief Complaint   Patient presents with    Shoulder Pain     LeFT, GREATER AT NIGHT    Arm Pain     Left, 1 week now     Patient reports tightness in left shoulder for the past one week that seems to worsen at night. Pain is starting to radiate to left arm. Did not improve with massage yesterday. She denies any injuries. She has been lifting light weights about 5 lbs.   She has rearranged pillows and flipped mattress. Patient's job is mostly sedentary with sitting and typing most of the day.     Social History     Social History Narrative    Not on file     Patricia Quezada family history includes Diabetes in her father.    Active Medications:  Review of patient's allergies indicates:  No Known Allergies  No current outpatient medications    Review of Systems   All other systems reviewed and are negative.    Vitals:    03/06/23 1356   BP: 120/76   BP Location: Left arm   Pulse: 101   Temp: 98.2 °F (36.8 °C)   SpO2: 99%   Weight: 69.6 kg (153 lb 7 oz)   Height: 5' 5" (1.651 m)       Physical Exam  Vitals reviewed.   Constitutional:       General: She is not in acute distress.  Musculoskeletal:      Cervical back: Tenderness present.        Assessment and Plan     Left sided upper back pain   Likely muscular. Could be related to poor work ergonomics   Recommended continued NSAID use and heat   Patient to try tens-unit   Refer to PT         Orders Placed This Visit  Orders Placed This Encounter   Procedures    Ambulatory referral/consult to Physical/Occupational Therapy     Standing Status:   Future     Standing Expiration Date:   4/6/2024     Referral Priority:   Routine     Referral Type:   Physical Medicine     Referral Reason:   Specialty Services Required     " Number of Visits Requested:   1           Upcoming Scheduled Appointments and Follow Up:    Future Appointments   Date Time Provider Department Center   5/12/2023 10:45 AM Kesha Guzman MD [facility]       Follow Up [facility] Care (with who? when?): No follow-ups on file.      Extended Emergency Contact Information  Primary Emergency Contact: Taqueria Christensen   [place]  Mobile Phone: [phone]  Relation: Relative      Marzena Bey MD   Attending Physician  Primary Care  3/6/2023 - 2:16 PM    I spent a total of 16 minutes on the day of the visit.This includes face to face time and non-face to face time preparing to see the patient (eg, review of tests), obtaining and/or reviewing separately obtained history, documenting clinical information in the electronic or other health record, independently interpreting results and communicating results to the patient/family/caregiver, or care coordinator.

## 2023-03-13 ENCOUNTER — CLINICAL SUPPORT (OUTPATIENT)
Dept: REHABILITATION | Facility: HOSPITAL | Age: 38
End: 2023-03-13
Attending: STUDENT IN AN ORGANIZED HEALTH CARE EDUCATION/TRAINING PROGRAM
Payer: COMMERCIAL

## 2023-03-13 DIAGNOSIS — M54.9 UPPER BACK PAIN ON LEFT SIDE: Primary | ICD-10-CM

## 2023-03-13 DIAGNOSIS — R29.898 SHOULDER WEAKNESS: ICD-10-CM

## 2023-03-13 PROCEDURE — 97140 MANUAL THERAPY 1/> REGIONS: CPT | Mod: PO

## 2023-03-13 PROCEDURE — 97162 PT EVAL MOD COMPLEX 30 MIN: CPT | Mod: PO,97

## 2023-03-13 PROCEDURE — 97110 THERAPEUTIC EXERCISES: CPT | Mod: PO

## 2023-03-13 NOTE — PLAN OF CARE
"[facility]   Physical Therapy Initial Evaluation     Date: 3/13/2023   Name: Patricia Quezada  Clinic Number: [id number]    Therapy Diagnosis:   Encounter Diagnoses   Name Primary?    Upper back pain on left side Yes    Shoulder weakness      Physician: Marzena Bey MD    Physician Orders: PT Eval and Treat   Medical Diagnosis from Referral: M54.9 (ICD-10-CM) - Upper back pain on left side  Evaluation Date: 3/13/2023  Authorization Period Expiration: 12/29/23  Plan of Care Expiration: 6/5/2023  Progress Note Due: 4/10/2023  Visit # / Visits authorized: -/ 20       Precautions: Standard    Time In: 0701  Time Out: 0800  Total Appointment Time (timed & untimed codes): 59 minutes      SUBJECTIVE   Date of onset: 2 weeks ago    History of current condition - Patricia reports: she woke up with a "knot" in her upper back around the left shoulder blade. She states the pain worsened over the next couple of days and has progressed to down the L arm to the elbow. She also states one bout of numbness in right pinky finger that improved with movement. She states that her pain keeps her up at night and has awoken  She states she is mostly a stomach sleeper, however has changed her sleeping position to her back and sides with little relief.     Falls: 0    Imaging, none    Prior Therapy: none  Social History: lives alone with a dog  Occupation: Private school; desk work  Prior Level of Function: IND  Current Level of Function: Noticed weakness on LUE (pt is L handed)    Pain:  Current 4/10, worst 6/10, best 1/10   Location: left shoulder  left shoulder(s)  Description: Aching  Aggravating Factors: Laying and Morning  Easing Factors: massage, heating pad, and rest    Non-Mechanical Origin:  Night Pain?  Yes  Hx of Cancer?  Yes  Trauma?  No  Sudden bowel/bladder changes?  No  Bone Density compromise?  No    Patients goals: To make the pain go away     Medical History:   Past Medical History: "   Diagnosis Date    ALL (acute lymphoid leukemia) in remission 1990    s/p chemo. Did 10 year f/u with oncologist and has been cleared       Surgical History:   Patricia Quezada  has no past surgical history on file.    Medications:   Patricia currently has no medications in their medication list.    Allergies:   Review of patient's allergies indicates:  No Known Allergies       OBJECTIVE     Posture: Slight rounded shoulders with increased thoracic kyphosis in sitting.    Cervical Range of Motion:    Degrees(%) Pain   Flexion WFL None     Extension WFL None     Right Rotation WFL Pulling on L     Left Rotation WFL none     Right Side Bending WFL Pulling on L   Left Side Bending WFL none      Shoulder Range of Motion:   Shoulder Left Right   Flexion WFL WFL   Abduction WFL WFL   ER WFL WFL   IR WFL WFL     Upper Extremity Strength  (R) UE  (L) UE    Shoulder flexion: 4/5 Shoulder flexion: 4/5   Shoulder Abduction: 4/5 Shoulder abduction: 4-/5   Shoulder ER 4/5 Shoulder ER 3+/5   Shoulder IR 4/5 Shoulder IR 4/5   Elbow flexion: 4/5 Elbow flexion: 4/5   Elbow extension: 4/5 Elbow extension: 3+/5   Wrist flexion: 4/5 Wrist flexion: 4/5   Wrist extension: 4/5 Wrist extension: 4/5    69 lbs : 59 lbs   Lower Trap 3+/5 Lower Trap 3-/5   Middle Trap 3/5 Middle Trap 3/5       Special Tests:  Distraction -   Spurlings -   Vertebral Artery -   Lateral Flexion Alar Ligament -   Transverse Ligament -   Shoulder Abduction Test -   Quadrant Testing -     Cervical joint mobility: Decreased lateral mobility on C5-C7 on L eft side       Reflexes:  -Bicep (C5): Intact  -Brachioradialis (C6): Intact  -Tricep (C7): Intact    Thoracic mobility: Hypomobility T2-T7    Palpation: Pain to palpation on L paraspinals and transverse processes and subscapularis      Sensation: Intact    Flexibility: WNL    Neural tension:   -ULTT Median: -  -ULTT Ulnar: -  -ULTT Radial: -        TREATMENT     Total Treatment time (time-based  "codes) separate from Evaluation: 45 minutes     Patricia received therapeutic exercises to develop strength, endurance, ROM, and posture for 30 minutes including:  -2X15 Prone T (1lb for first bout; no weight for second bout)  -2x15 Prone Y   -2x15 Prone Shoulder EXT  -2X15 Prone W  -2x10 Seated scap retraction with 3" hold     *During Prone exercises pt noted with poor elbow extension and required frequent cueing to complete correctly      Patricia received the following manual therapy techniques: Joint mobilizations and Soft tissue Mobilization were applied to the: lower cervical spine (C5-C7) and STM and trigger point release to subscapularis for 15 minutes      PATIENT EDUCATION AND HOME EXERCISES     Education provided:   - HEP    Written Home Exercises Provided: yes. Exercises were reviewed and Patricia was able to demonstrate them prior to the end of the session.  Patricia demonstrated good  understanding of the education provided. See EMR under Patient Instructions for exercises provided during therapy sessions.    ASSESSMENT     Patricia is a 37 y.o. female referred to outpatient Physical Therapy with a medical diagnosis of M54.9 (ICD-10-CM) - Upper back pain on left side. Patient presents with   Medical necessity is demonstrated by the following IMPAIRMENTS/PROBLEMS:  -Decreased function (NDI)   -Increased pain (NPS)   -Decreased pain free cervical AROM   -Decreased UE strength (MMT)   -Impaired sitting posture (Craniocervical angle)  -Decreased participation in regular exercise routine  - (+) TTP: Subscapularis  -poor proprioception of LUE    Patient prognosis is Good.   Patientt will benefit from skilled outpatient Physical Therapy to address the deficits stated above and in the chart below, provide patient /family education, and to maximize patientt's level of independence.     Plan of care discussed with patient: Yes  Patient's spiritual, cultural and educational needs considered and patient is " agreeable to the plan of care and goals as stated below:     Anticipated Barriers for therapy: None    Medical Necessity is demonstrated by the following  History  Co-morbidities and personal factors that may impact the plan of care Co-morbidities:   history of cancer    Personal Factors:   age     moderate   Examination  Body Structures and Functions, activity limitations and participation restrictions that may impact the plan of care Body Regions:   neck  back  upper extremities    Body Systems:    gross symmetry  ROM  strength  gross coordinated movement  motor control    Participation Restrictions:   None    Activity limitations:   Learning and applying knowledge  no deficits    General Tasks and Commands  no deficits    Communication  no deficits    Mobility  lifting and carrying objects    Self care  no deficits    Domestic Life  no deficits    Interactions/Relationships  no deficits    Life Areas  no deficits    Community and Social Life  no deficits         moderate   Clinical Presentation evolving clinical presentation with changing clinical characteristics moderate   Decision Making/ Complexity Score: moderate       GOALS: Short Term Goals: 4 weeks  1.Report decreased L shoulder/scapular pain  <   / =  4  /10  to increase tolerance for with functional reaching  2. Increase cervical ROM by 5-10 degrees in order to perform ADLs with decreased difficulty.  3. Increase strength in B shoulders/scapular stabilizers by 1/3 MMT grade to increase tolerance for ADL and work activities.  4. Pt to tolerate HEP to improve ROM and independence with ADL's    Long Term Goals: 8 weeks  1.Report decreased L shoulder/scapular pain  <   / =  2  /10  to increase tolerance for functional reaching  2. Increase UE/neck flexibility in order to improve posture.    3.Increased strength in B shoulders/scapular stabilizers to >/= 4/5 MMT grade to increase tolerance for ADL and work activities.  4. Increase strength in L elbow extensor  to 4/5 MMT to increased tolerance to reaching and ADLs      PLAN   Plan of care Certification: 3/13/2023 to 6/5/2023.    Outpatient Physical Therapy 2 times weekly for 10 weeks to include the following interventions: Cervical/Lumbar Traction, Moist Heat/ Ice, Neuromuscular Re-ed, Patient Education, Self Care, Therapeutic Activities, and Therapeutic Exercise.     Pt will likely benefit from further imaging of cervical spine to rule-out other pathology, however, pt is still appropriate to be seen for above deficits.     Winter Sosa, PT      I CERTIFY THE NEED FOR THESE SERVICES FURNISHED UNDER THIS PLAN OF TREATMENT AND WHILE UNDER MY CARE   Physician's comments:     Physician's Signature: ___________________________________________________

## 2023-03-14 ENCOUNTER — TELEPHONE (OUTPATIENT)
Dept: PRIMARY CARE CLINIC | Facility: CLINIC | Age: 38
End: 2023-03-14
Payer: COMMERCIAL

## 2023-03-14 ENCOUNTER — HOSPITAL ENCOUNTER (OUTPATIENT)
Dept: RADIOLOGY | Facility: HOSPITAL | Age: 38
Discharge: HOME | End: 2023-03-14
Attending: STUDENT IN AN ORGANIZED HEALTH CARE EDUCATION/TRAINING PROGRAM
Payer: COMMERCIAL

## 2023-03-14 DIAGNOSIS — M54.9 UPPER BACK PAIN ON LEFT SIDE: ICD-10-CM

## 2023-03-14 DIAGNOSIS — M54.9 UPPER BACK PAIN ON LEFT SIDE: Primary | ICD-10-CM

## 2023-03-14 DIAGNOSIS — R29.898 SHOULDER WEAKNESS: ICD-10-CM

## 2023-03-14 PROCEDURE — 72040 X-RAY EXAM NECK SPINE 2-3 VW: CPT | Mod: TC,PN

## 2023-03-14 PROCEDURE — 72040 XR CERVICAL SPINE AP LATERAL: ICD-10-PCS | Mod: 26,,, | Performed by: RADIOLOGY

## 2023-03-14 PROCEDURE — 72040 X-RAY EXAM NECK SPINE 2-3 VW: CPT | Mod: 26,,, | Performed by: RADIOLOGY

## 2023-03-14 NOTE — TELEPHONE ENCOUNTER
----- Message from Marzena Bey MD sent at 3/14/2023  9:31 AM CDT -----  Please schedule patient for xray per physical therapy recommendation

## 2023-03-16 ENCOUNTER — CLINICAL SUPPORT (OUTPATIENT)
Dept: REHABILITATION | Facility: HOSPITAL | Age: 38
End: 2023-03-16
Payer: COMMERCIAL

## 2023-03-16 DIAGNOSIS — M54.9 UPPER BACK PAIN ON LEFT SIDE: Primary | ICD-10-CM

## 2023-03-16 DIAGNOSIS — R29.898 SHOULDER WEAKNESS: ICD-10-CM

## 2023-03-16 PROCEDURE — 97140 MANUAL THERAPY 1/> REGIONS: CPT | Mod: PO,CQ,97

## 2023-03-16 PROCEDURE — 97110 THERAPEUTIC EXERCISES: CPT | Mod: PO,CQ

## 2023-03-16 PROCEDURE — 97112 NEUROMUSCULAR REEDUCATION: CPT | Mod: PO,CQ,97

## 2023-03-16 NOTE — PROGRESS NOTES
"  [facility]   Physical Therapy Treatment Note     Name: Patricia Mireles [place]  Clinic Number: [id number]    Therapy Diagnosis:   Encounter Diagnoses   Name Primary?    Upper back pain on left side Yes    Shoulder weakness      Physician: Marzena Bey MD    Visit Date: 3/16/2023    Physician Orders: PT Eval and Treat   Medical Diagnosis from Referral: M54.9 (ICD-10-CM) - Upper back pain on left side  Evaluation Date: 3/13/2023  Authorization Period Expiration: 12/29/23  Plan of Care Expiration: 6/5/2023  Progress Note Due: 4/10/2023  Visit # / Visits authorized: 1/ 20      Precautions: Standard    PTA Visit #: 1/5     Time In: 0705  Time Out: 0756  Total Billable Time: 51 minutes    SUBJECTIVE     Pt reports: her shoulder is feeling better, even sleeping a little bit better, but still has pain into the upper arm.   She was compliant with home exercise program.  Response to previous treatment: no complaints  Functional change: sleeping better    Pain: -/10 (not asked)  Location: left shoulder      OBJECTIVE     Objective Measures updated at progress report unless specified.     TREATMENT     Patricia received the treatments listed below:      therapeutic exercises to develop strength, endurance, flexibility, and posture for 23 minutes including:   -UBE 6' (3/3)  -2x15 Prone T   -2x15 Prone Y   -2x15 Prone Shoulder EXT  -2x10 Seated scap retraction with 3" hold  -3x20" hold UT stretch  -3x20" hold LS stretch  -2x10 vs RTB resisted row     neuromuscular re-education activities to improve: Coordination, Sense, and Rotator Cuff stability for 15 minutes. The following activities were included:  -2x10 vs 1# Supine Skull   -2x10 vs 1# S/L ER    -2x10 vs 1# S/L abduction   -2x15 Prone W  -2x10 vs YTB bilateral shoulder ER w/ scap retract    manual therapy techniques: Joint mobilizations and Soft tissue Mobilization were applied to the: lower cervical spine (C5-C7) and STM and trigger " point release to subscapularis for 13 minutes    Patient Education and Home Exercises     Home Exercises Provided and Patient Education Provided     Education provided:   - Exercise rationale  - Demonstrate and cuing for all exericse  - HEP review/update    Written Home Exercises Provided: yes. Exercises were reviewed and Patricia was able to demonstrate them prior to the end of the session.  Patricia demonstrated good  understanding of the education provided. See EMR under Patient Instructions for exercises provided during therapy sessions    ASSESSMENT     Patient tolerates her program and progressions well this date and denies any adverse affects or increase in symptoms upon completion of her visit. She demonstrates significant weakness through the LUE which contributes to her functional deficits in which she would benefit from continued skilled treatment.     Patricia Is progressing well towards her goals.   Pt prognosis is Good.     Pt will continue to benefit from skilled outpatient physical therapy to address the deficits listed in the problem list box on initial evaluation, provide pt/family education and to maximize pt's level of independence in the home and community environment.     Pt's spiritual, cultural and educational needs considered and pt agreeable to plan of care and goals.     Anticipated barriers to physical therapy: none    Goals:   Short Term Goals: 4 weeks  1.Report decreased L shoulder/scapular pain  <   / =  4  /10  to increase tolerance for with functional reaching  2. Increase cervical ROM by 5-10 degrees in order to perform ADLs with decreased difficulty.  3. Increase strength in B shoulders/scapular stabilizers by 1/3 MMT grade to increase tolerance for ADL and work activities.  4. Pt to tolerate HEP to improve ROM and independence with ADL's     Long Term Goals: 8 weeks  1.Report decreased L shoulder/scapular pain  <   / =  2  /10  to increase tolerance for functional reaching  2.  Increase UE/neck flexibility in order to improve posture.    3.Increased strength in B shoulders/scapular stabilizers to >/= 4/5 MMT grade to increase tolerance for ADL and work activities.  4. Increase strength in L elbow extensor to 4/5 MMT to increased tolerance to reaching and ADLs    PLAN   Plan of care Certification: 3/13/2023 to 6/5/2023.    Continue Outpatient Physical Therapy per patient tolerance and POC.     Carley Butterfield, PTA

## 2023-03-20 ENCOUNTER — CLINICAL SUPPORT (OUTPATIENT)
Dept: REHABILITATION | Facility: HOSPITAL | Age: 38
End: 2023-03-20
Payer: COMMERCIAL

## 2023-03-20 DIAGNOSIS — M54.9 UPPER BACK PAIN ON LEFT SIDE: Primary | ICD-10-CM

## 2023-03-20 DIAGNOSIS — R29.898 SHOULDER WEAKNESS: ICD-10-CM

## 2023-03-20 PROCEDURE — 97140 MANUAL THERAPY 1/> REGIONS: CPT | Mod: PO

## 2023-03-20 PROCEDURE — 97110 THERAPEUTIC EXERCISES: CPT | Mod: PO,97

## 2023-03-20 PROCEDURE — 97112 NEUROMUSCULAR REEDUCATION: CPT | Mod: PO,97

## 2023-03-20 NOTE — PROGRESS NOTES
"  [facility]   Physical Therapy Treatment Note     Name: Patricia Mireles [place]  Clinic Number: [id number]    Therapy Diagnosis:   Encounter Diagnoses   Name Primary?    Upper back pain on left side Yes    Shoulder weakness        Physician: Marzena Bey MD    Visit Date: 3/20/2023    Physician Orders: PT Eval and Treat   Medical Diagnosis from Referral: M54.9 (ICD-10-CM) - Upper back pain on left side  Evaluation Date: 3/13/2023  Authorization Period Expiration: 12/29/23  Plan of Care Expiration: 6/5/2023  Progress Note Due: 4/10/2023  Visit # / Visits authorized: 1/ 20      Precautions: Standard    PTA Visit #: 1/5     Time In: 0702  Time Out: 0758  Total Billable Time: 56 minutes    SUBJECTIVE     Pt reports: "The pain in upper back and shoulder is better, but I still have pain in my elbow."  She was compliant with home exercise program.  Response to previous treatment: improved pain  Functional change: sleeping better    Pain: 3/10  Location: left elbow      OBJECTIVE     Objective Measures updated at progress report unless specified.     TREATMENT     Patricia received the treatments listed below:      therapeutic exercises to develop strength, endurance, flexibility, and posture for 30 minutes including:   -UBE 6' (3/3)  -2x15 Standing T vs RTB   -2x15 Standing Y vs YTB  -2x15 Standing Shoulder EXT vs GTB on first; RTB on second  -2x15 vs RTB standing resisted row  -2x10 ISO Shoulder ER/IR walkouts x2 steps s RTB each way  -2X10 Wall angels  -2x10 Wall press with roller  -3x20" hold UT stretch  -3x20" hold LS stretch       neuromuscular re-education activities to improve: Coordination, Sense, and Rotator Cuff stability for 15 minutes. The following activities were included:\   -2x15 Supine shoulder protraction vs 1#  -2x15 vs 1# Supine Skull   -2x15vs 1# open books  -2x10 vs 1# S/L ER    -2X60" PNF D1/D2 with graded resistance from therapist   -2x10 Subscap Liftoff " prone    manual therapy techniques: Joint mobilizations and Soft tissue Mobilization were applied to the: lower cervical spine (C5-C7) and STM and trigger point release to subscapularis for 11 minutes    Patient Education and Home Exercises     Home Exercises Provided and Patient Education Provided     Education provided:   - Exercise rationale  - Demonstrate and cuing for all exericse  - HEP review/update    Written Home Exercises Provided: yes. Exercises were reviewed and Patricia was able to demonstrate them prior to the end of the session.  Patricia demonstrated good  understanding of the education provided. See EMR under Patient Instructions for exercises provided during therapy sessions    ASSESSMENT     Patient progressing well with therex with improved strength in LUE elbow extensors. Pt continues to be limited by decreased scapular stabilization and decreased strength in L shoulder and LUE.    Patricia Is progressing well towards her goals.   Pt prognosis is Good.     Pt will continue to benefit from skilled outpatient physical therapy to address the deficits listed in the problem list box on initial evaluation, provide pt/family education and to maximize pt's level of independence in the home and community environment.     Pt's spiritual, cultural and educational needs considered and pt agreeable to plan of care and goals.     Anticipated barriers to physical therapy: none    Goals:   Short Term Goals: 4 weeks  1.Report decreased L shoulder/scapular pain  <   / =  4  /10  to increase tolerance for with functional reaching  2. Increase cervical ROM by 5-10 degrees in order to perform ADLs with decreased difficulty.  3. Increase strength in B shoulders/scapular stabilizers by 1/3 MMT grade to increase tolerance for ADL and work activities.  4. Pt to tolerate HEP to improve ROM and independence with ADL's     Long Term Goals: 8 weeks  1.Report decreased L shoulder/scapular pain  <   / =  2  /10  to  increase tolerance for functional reaching  2. Increase UE/neck flexibility in order to improve posture.    3.Increased strength in B shoulders/scapular stabilizers to >/= 4/5 MMT grade to increase tolerance for ADL and work activities.  4. Increase strength in L elbow extensor to 4/5 MMT to increased tolerance to reaching and ADLs    PLAN   Plan of care Certification: 3/13/2023 to 6/5/2023.    Continue Outpatient Physical Therapy per patient tolerance and POC.     Winter Sosa, PT

## 2023-03-23 ENCOUNTER — CLINICAL SUPPORT (OUTPATIENT)
Dept: REHABILITATION | Facility: HOSPITAL | Age: 38
End: 2023-03-23
Payer: COMMERCIAL

## 2023-03-23 DIAGNOSIS — M54.9 UPPER BACK PAIN ON LEFT SIDE: Primary | ICD-10-CM

## 2023-03-23 DIAGNOSIS — R29.898 SHOULDER WEAKNESS: ICD-10-CM

## 2023-03-23 PROCEDURE — 97140 MANUAL THERAPY 1/> REGIONS: CPT | Mod: PO,CQ

## 2023-03-23 PROCEDURE — 97110 THERAPEUTIC EXERCISES: CPT | Mod: PO,CQ

## 2023-03-23 NOTE — PROGRESS NOTES
"  [facility]   Physical Therapy Treatment Note     Name: Patricia Mireles [place]  Clinic Number: [id number]    Therapy Diagnosis:   Encounter Diagnoses   Name Primary?    Upper back pain on left side Yes    Shoulder weakness      Physician: Marzena Bey MD    Visit Date: 3/23/2023    Physician Orders: PT Eval and Treat   Medical Diagnosis from Referral: M54.9 (ICD-10-CM) - Upper back pain on left side  Evaluation Date: 3/13/2023  Authorization Period Expiration: 12/29/23  Plan of Care Expiration: 6/5/2023  Progress Note Due: 4/10/2023  Visit # / Visits authorized: 1/ 20      Precautions: Standard    PTA Visit #: 1/5     Time In: 0702  Time Out: 0757  Total Billable Time: 55 minutes    SUBJECTIVE     Pt reports: she has been extremely painful since her last visit. She is feeling this through the neck, back, and whole LUE. She has had sleepless nights, increased tingling into the left hand, using heat/hot baths with mild short term relief.    She was not able to be compliant with home exercise program due to painful attempts.  Response to previous treatment: improved pain  Functional change: sleeping better    Pain: 8/10  Location: left elbow      OBJECTIVE     Objective Measures updated at progress report unless specified.     TREATMENT     Patricia received the treatments listed below:    Bold = completed    therapeutic exercises to develop strength, endurance, flexibility, and posture for 38 minutes including:   -UBE 6' (3/3)  -2x15 Prone T   -2x15 Prone Y   -2x15 Prone Shoulder EXT  -2x10 Seated scap retraction with 3" hold  -2x10 Seated chin tuck with 3" hold  -3x30" hold UT stretch  -3x30" hold LS stretch  -10x5" hold ea side open book stretch  -2x15 vs RTB standing resisted row  -2x15 Standing Shoulder EXT vs GTB on first; RTB on second  -2x10 ISO Shoulder ER/IR walkouts x2 steps s RTB each way  -2X10 Wall angels  -2x10 Serratus wall slide with foam roller     neuromuscular " re-education activities to improve: Coordination, Sense, and Rotator Cuff stability for 05 minutes. The following activities were included:  -2x10 vs 1# Supine Skull   -2x10 vs 1# S/L ER    -2x10 vs 1# S/L abduction   -2x15 Prone W  -2x10 vs YTB bilateral shoulder ER w/ scap retract   -2x15 vs 1# Supine serratus punch   -2x10 Subscap Liftoff prone    manual therapy techniques: Joint mobilizations and Soft tissue Mobilization were applied to the: lower cervical spine for 12 minutes   -(C5-C7) and STM and trigger point release to subscapularis (not performed)   -STM to cervical paraspinals, UT, LS   -Scapular mobilizations    -Passive radial nerve glide     Patient Education and Home Exercises     Home Exercises Provided and Patient Education Provided     Education provided:   - Exercise rationale  - Demonstrate and cuing for all exericse  - HEP review/update    Written Home Exercises Provided: Patient instructed to cont prior HEP. Exercises were reviewed and Patricia was able to demonstrate them prior to the end of the session.  Patricia demonstrated good  understanding of the education provided. See EMR under Patient Instructions for exercises provided during therapy sessions    ASSESSMENT     Completed significantly modified session this date due to pt's increase in pain. She presents with minimal improvement in symptoms upon completion of her session as we perform gentle stretching and postural strengthening/reeducation. Provided pt with education regarding completion of her HEP and use of modalities to address increased symptoms to allow for a hopeful return to her prior level of function.     Patricia is progressing well towards her goals.   Pt prognosis is Good.     Pt will continue to benefit from skilled outpatient physical therapy to address the deficits listed in the problem list box on initial evaluation, provide pt/family education and to maximize pt's level of independence in the home and  community environment.     Pt's spiritual, cultural and educational needs considered and pt agreeable to plan of care and goals.     Anticipated barriers to physical therapy: none    Goals:   Short Term Goals: 4 weeks  1.Report decreased L shoulder/scapular pain  <   / =  4  /10  to increase tolerance for with functional reaching  2. Increase cervical ROM by 5-10 degrees in order to perform ADLs with decreased difficulty.  3. Increase strength in B shoulders/scapular stabilizers by 1/3 MMT grade to increase tolerance for ADL and work activities.  4. Pt to tolerate HEP to improve ROM and independence with ADL's     Long Term Goals: 8 weeks  1.Report decreased L shoulder/scapular pain  <   / =  2  /10  to increase tolerance for functional reaching  2. Increase UE/neck flexibility in order to improve posture.    3.Increased strength in B shoulders/scapular stabilizers to >/= 4/5 MMT grade to increase tolerance for ADL and work activities.  4. Increase strength in L elbow extensor to 4/5 MMT to increased tolerance to reaching and ADLs    PLAN   Plan of care Certification: 3/13/2023 to 6/5/2023.    Continue Outpatient Physical Therapy per patient tolerance and POC.     Carley Butterfield, PTA

## 2023-03-27 ENCOUNTER — CLINICAL SUPPORT (OUTPATIENT)
Dept: REHABILITATION | Facility: HOSPITAL | Age: 38
End: 2023-03-27
Payer: COMMERCIAL

## 2023-03-27 DIAGNOSIS — M54.9 UPPER BACK PAIN ON LEFT SIDE: Primary | ICD-10-CM

## 2023-03-27 DIAGNOSIS — R29.898 SHOULDER WEAKNESS: ICD-10-CM

## 2023-03-27 PROCEDURE — 97112 NEUROMUSCULAR REEDUCATION: CPT | Mod: PO,97

## 2023-03-27 PROCEDURE — 97530 THERAPEUTIC ACTIVITIES: CPT | Mod: PO

## 2023-03-27 PROCEDURE — 97110 THERAPEUTIC EXERCISES: CPT | Mod: PO

## 2023-03-27 NOTE — PROGRESS NOTES
" [facility]   Physical Therapy Treatment Note     Name: Patricia Mireles [place]  Clinic Number: [id number]    Therapy Diagnosis:   Encounter Diagnoses   Name Primary?    Upper back pain on left side Yes    Shoulder weakness      Physician: Marzena Bey MD    Visit Date: 3/27/2023    Physician Orders: PT Eval and Treat   Medical Diagnosis from Referral: M54.9 (ICD-10-CM) - Upper back pain on left side  Evaluation Date: 3/13/2023  Authorization Period Expiration: 12/29/23  Plan of Care Expiration: 6/5/2023  Progress Note Due: 4/10/2023  Visit # / Visits authorized: 4/20  Precautions: Standard  PTA Visit #: 0/5    Time In: 8:05 am  Time Out: 9:00 am  Total Billable Time: 55 minutes    SUBJECTIVE     Patient reports she has been having some intermittent tingling/numbness in her left hand this morning, and reports 2/10 tingling to her left elbow in standing upon arrival.    She was partially compliant with home exercise program due to painful attempts.  Response to previous treatment: improved pain  Functional change: sleeping better    Pain: 2/10 tingling  Location: left elbow    OBJECTIVE     Objective Measures updated at progress report unless specified.    TREATMENT     Patricia received the treatments listed below:    Bold = completed    Therapeutic Activities to improve functional performance for 15 minutes, including:  -Upper Body Ergometer, 8 minutes (forwards/backwards), level 1.0  -Patient Education (See Below)    Therapeutic Exercise to develop strength, endurance, flexibility, and posture for 25 minutes including:   -3x10 with 3" hold, Seated chin tuck with self/clinician overpressure   -3x10 with 3" hold, Seated chin tuck with upper cervical nods (abolished left arm paresthesias)   -2x15 Seated scap retraction with 3" hold  -3x30" hold, Upper trapezius stretch  -3x30" hold, Levator scapula stretch  -10x5" hold ea side open book stretch  -2x15 vs RTB standing resisted " row  -2x15 Standing Shoulder EXT vs GTB on first; RTB on second  -2x10 ISO Shoulder ER/IR walkouts x2 steps s RTB each way  -2X10 Wall angels  -2x10 Serratus wall slide with foam roller     Neuromuscular Re-Education activities to improve: Coordination, Sense, and Rotator Cuff stability for 15 minutes. The following activities were included:   -3x15 vs 2# Supine serratus punch   -3x15 vs 2# Side lying external rotation   -3x30 vs YTB Supine Y's  -2x10 vs 1# Supine Skull   -2x10 vs 1# S/L ER    -2x10 vs 1# S/L abduction   -2x15 Prone W  -2x10 vs YTB bilateral shoulder ER w/ scap retract    manual therapy techniques: Joint mobilizations and Soft tissue Mobilization were applied to the: lower cervical spine for 0 minutes   -(C5-C7) and STM and trigger point release to subscapularis (not performed)   -STM to cervical paraspinals, UT, LS   -Scapular mobilizations    -Passive radial nerve glide     Patient Education and Home Exercises     Home Exercises Provided and Patient Education Provided     Education provided:   - Exercise rationale  - Demonstrate and cuing for all exericse  - HEP review/update    Written Home Exercises Provided: Patient instructed to cont prior HEP. Exercises were reviewed and Patricia was able to demonstrate them prior to the end of the session.  Patricia demonstrated good  understanding of the education provided. See EMR under Patient Instructions for exercises provided during therapy sessions    ASSESSMENT     Patient presenting with right elbow and hand paresthesias upon arrival to session, which were abolished with upper cervical flexion nods periodically throughout the session. Progressed konstantin-scapular, rotator cuff, and postural muscle strengthening per patient tolerance which she tolerated well. Instructed patient to add upper cervical flexion nods to HEP as reductive exercise whenever she is experiencing parthesias, which she verbalized understanding of.    Patricia is  progressing well towards her goals.   Pt prognosis is Good.     Pt will continue to benefit from skilled outpatient physical therapy to address the deficits listed in the problem list box on initial evaluation, provide pt/family education and to maximize pt's level of independence in the home and community environment.     Pt's spiritual, cultural and educational needs considered and pt agreeable to plan of care and goals.     Anticipated barriers to physical therapy: none    Goals:   Short Term Goals: 4 weeks  1.Report decreased L shoulder/scapular pain  <   / =  4  /10  to increase tolerance for with functional reaching  2. Increase cervical ROM by 5-10 degrees in order to perform ADLs with decreased difficulty.  3. Increase strength in B shoulders/scapular stabilizers by 1/3 MMT grade to increase tolerance for ADL and work activities.  4. Pt to tolerate HEP to improve ROM and independence with ADL's     Long Term Goals: 8 weeks  1.Report decreased L shoulder/scapular pain  <   / =  2  /10  to increase tolerance for functional reaching  2. Increase UE/neck flexibility in order to improve posture.    3.Increased strength in B shoulders/scapular stabilizers to >/= 4/5 MMT grade to increase tolerance for ADL and work activities.  4. Increase strength in L elbow extensor to 4/5 MMT to increased tolerance to reaching and ADLs    PLAN   Plan of care Certification: 3/13/2023 to 6/5/2023.    Continue Outpatient Physical Therapy per patient tolerance and POC.     Umair Finley, PT

## 2023-03-29 ENCOUNTER — CLINICAL SUPPORT (OUTPATIENT)
Dept: REHABILITATION | Facility: HOSPITAL | Age: 38
End: 2023-03-29
Payer: COMMERCIAL

## 2023-03-29 DIAGNOSIS — R29.898 SHOULDER WEAKNESS: ICD-10-CM

## 2023-03-29 DIAGNOSIS — M54.9 UPPER BACK PAIN ON LEFT SIDE: Primary | ICD-10-CM

## 2023-03-29 PROCEDURE — 97112 NEUROMUSCULAR REEDUCATION: CPT | Mod: PO,97

## 2023-03-29 PROCEDURE — 97530 THERAPEUTIC ACTIVITIES: CPT | Mod: PO

## 2023-03-29 PROCEDURE — 97140 MANUAL THERAPY 1/> REGIONS: CPT | Mod: PO

## 2023-03-29 PROCEDURE — 97110 THERAPEUTIC EXERCISES: CPT | Mod: PO

## 2023-03-29 NOTE — PROGRESS NOTES
"  [facility]   Physical Therapy Treatment Note     Name: Patricia Mireles [place]  Clinic Number: [id number]    Therapy Diagnosis:   Encounter Diagnoses   Name Primary?    Upper back pain on left side Yes    Shoulder weakness        Physician: Marzena Bey MD    Visit Date: 3/29/2023    Physician Orders: PT Eval and Treat   Medical Diagnosis from Referral: M54.9 (ICD-10-CM) - Upper back pain on left side  Evaluation Date: 3/13/2023  Authorization Period Expiration: 12/29/23  Plan of Care Expiration: 6/5/2023  Progress Note Due: 4/10/2023  Visit # / Visits authorized: 4/20  Precautions: Standard  PTA Visit #: 0/5    Time In: 7:02 am  Time Out: 8:00 am  Total Billable Time: 58 minutes    SUBJECTIVE     Patient reports"I have been sleeping a lot better. The pain is better in my arm, but I still have pain in my shoulder."    She was partially compliant with home exercise program due to painful attempts.  Response to previous treatment: improved pain  Functional change: sleeping better    Pain: 2/10 aching  Location: medial to L eft scap    OBJECTIVE     Objective Measures updated at progress report unless specified.    TREATMENT     Patricia received the treatments listed below:    Bold = completed    Therapeutic Activities to improve functional performance for 15 minutes, including:  -Upper Body Ergometer, 8 minutes (forwards/backwards), level 2.5     Therapeutic Exercise to develop strength, endurance, flexibility, and posture for 25 minutes including:   -1x20 Thoracic Extension over foam roll  -3x10 with 3" hold, Seated chin tuck with upper cervical nods (abolished left arm paresthesias)   -2x20 Shoulder Circles Forwards Backwards each direction  -2X10 Wall angels  -2X10 Quadruped alternating shoulder extension     Neuromuscular Re-Education activities to improve: Coordination, Sense, and Rotator Cuff stability for 15 minutes. The following activities were included:   -3x15 vs 2# " Supine serratus punch   -3x15 vs 2# Side lying external rotation   -2x15 Standing Single Shoulder Horizontal ABD vs RTB     manual therapy techniques: Joint mobilizations and Soft tissue Mobilization were applied to the: lower cervical spine for 10 minutes   -STM and trigger point release to subscapularis    -STM to cervical paraspinals, UT, LS   -Scapular mobilizations     Patient Education and Home Exercises     Home Exercises Provided and Patient Education Provided     Education provided:   - Exercise rationale  - Demonstrate and cuing for all exericse  - HEP review/update    Written Home Exercises Provided: Patient instructed to cont prior HEP. Exercises were reviewed and Patricia was able to demonstrate them prior to the end of the session.  Patricia demonstrated good  understanding of the education provided. See EMR under Patient Instructions for exercises provided during therapy sessions    ASSESSMENT     Patient progressing well with scapular and postural strengthening. Pt presents today with intermittent bouts of numbness and tingling in L thumb and pointer finger that improves sporadically. Pt continues to be limited by decreased strength and decreased postural endurance..    Patricia is progressing well towards her goals.   Pt prognosis is Good.     Pt will continue to benefit from skilled outpatient physical therapy to address the deficits listed in the problem list box on initial evaluation, provide pt/family education and to maximize pt's level of independence in the home and community environment.     Pt's spiritual, cultural and educational needs considered and pt agreeable to plan of care and goals.     Anticipated barriers to physical therapy: none    Goals:   Short Term Goals: 4 weeks  1.Report decreased L shoulder/scapular pain  <   / =  4  /10  to increase tolerance for with functional reaching  2. Increase cervical ROM by 5-10 degrees in order to perform ADLs with decreased difficulty.  3.  Increase strength in B shoulders/scapular stabilizers by 1/3 MMT grade to increase tolerance for ADL and work activities.  4. Pt to tolerate HEP to improve ROM and independence with ADL's     Long Term Goals: 8 weeks  1.Report decreased L shoulder/scapular pain  <   / =  2  /10  to increase tolerance for functional reaching  2. Increase UE/neck flexibility in order to improve posture.    3.Increased strength in B shoulders/scapular stabilizers to >/= 4/5 MMT grade to increase tolerance for ADL and work activities.  4. Increase strength in L elbow extensor to 4/5 MMT to increased tolerance to reaching and ADLs    PLAN   Plan of care Certification: 3/13/2023 to 6/5/2023.    Continue Outpatient Physical Therapy per patient tolerance and POC.     Winter Sosa, PT

## 2023-04-03 ENCOUNTER — CLINICAL SUPPORT (OUTPATIENT)
Dept: REHABILITATION | Facility: HOSPITAL | Age: 38
End: 2023-04-03
Payer: COMMERCIAL

## 2023-04-03 DIAGNOSIS — M54.9 UPPER BACK PAIN ON LEFT SIDE: Primary | ICD-10-CM

## 2023-04-03 DIAGNOSIS — R29.898 SHOULDER WEAKNESS: ICD-10-CM

## 2023-04-03 PROCEDURE — 97140 MANUAL THERAPY 1/> REGIONS: CPT | Mod: PO

## 2023-04-03 PROCEDURE — 97112 NEUROMUSCULAR REEDUCATION: CPT | Mod: PO

## 2023-04-03 PROCEDURE — 97110 THERAPEUTIC EXERCISES: CPT | Mod: PO,97

## 2023-04-03 PROCEDURE — 97530 THERAPEUTIC ACTIVITIES: CPT | Mod: PO,97

## 2023-04-03 NOTE — PROGRESS NOTES
"  [facility]   Physical Therapy Treatment Note     Name: Patricia Mireles [place]  Clinic Number: [id number]    Therapy Diagnosis:   Encounter Diagnoses   Name Primary?    Upper back pain on left side Yes    Shoulder weakness        Physician: Marzena Bey MD    Visit Date: 4/3/2023    Physician Orders: PT Eval and Treat   Medical Diagnosis from Referral: M54.9 (ICD-10-CM) - Upper back pain on left side  Evaluation Date: 3/13/2023  Authorization Period Expiration: 12/29/23  Plan of Care Expiration: 6/5/2023  Progress Note Due: 4/10/2023  Visit # / Visits authorized: 5/20  Precautions: Standard  PTA Visit #: 0/5    Time In: 7:02 am  Time Out: 8:00 am  Total Billable Time: 58 minutes    SUBJECTIVE     Patient reports "After I left here on Wednesday, that was the best my arm has felt. It got tight again on Friday, but it felt really good for a couple of days."    She was partially compliant with home exercise program due to painful attempts.  Response to previous treatment: improved pain and tingling  Functional change: sleeping better less pain    Pain: 1/10 aching  Location: medial to L eft scap    OBJECTIVE     Objective Measures updated at progress report unless specified.    TREATMENT     Patricia received the treatments listed below:    Bold = completed    Therapeutic Activities to improve functional performance for 8 minutes, including:  -Upper Body Ergometer, 8 minutes (forwards/backwards), level 2.5     Therapeutic Exercise to develop strength, endurance, flexibility, and posture for 25 minutes including:   -2x20 Shoulder Circles Forwards Backwards each direction  -2X10 Wall angels  -Prone I/T/Y/W with 1-2# 2x15 each  -S/L ER vs 2LB  -S/L open books vs 3LB     Neuromuscular Re-Education activities to improve: Coordination, Sense, and Rotator Cuff stability for 12 minutes. The following activities were included:   -3x15 vs 3# Supine serratus punch   -Quadruped Push-up+ " 2X10  -2X10 Quadruped alternating shoulder extension     manual therapy techniques: Joint mobilizations and Soft tissue Mobilization were applied to the: lower cervical spine for 12 minutes   -STM and trigger point release to subscapularis    -STM to cervical paraspinals, UT, LS, rhomboid   -Scapular mobilizations     Patient Education and Home Exercises     Home Exercises Provided and Patient Education Provided     Education provided:   - Exercise rationale  - Demonstrate and cuing for all exericse  - HEP review/update    Written Home Exercises Provided: Patient instructed to cont prior HEP. Exercises were reviewed and Patricia was able to demonstrate them prior to the end of the session.  Patricia demonstrated good  understanding of the education provided. See EMR under Patient Instructions for exercises provided during therapy sessions    ASSESSMENT     Patient progressing well with scapular and postural strengthening. Pt presents today with improved pain in L shoulder and arm. Pt continues to be limited by decreased scapular muscular endurance and strength. Pt noted with decreased scapular stability with BUE WB especially noted in quadruped with scapular winging.     Patricia is progressing well towards her goals.   Pt prognosis is Good.     Pt will continue to benefit from skilled outpatient physical therapy to address the deficits listed in the problem list box on initial evaluation, provide pt/family education and to maximize pt's level of independence in the home and community environment.     Pt's spiritual, cultural and educational needs considered and pt agreeable to plan of care and goals.     Anticipated barriers to physical therapy: none    Goals:   Short Term Goals: 4 weeks  1.Report decreased L shoulder/scapular pain  <   / =  4  /10  to increase tolerance for with functional reaching  2. Increase cervical ROM by 5-10 degrees in order to perform ADLs with decreased difficulty.  3. Increase  strength in B shoulders/scapular stabilizers by 1/3 MMT grade to increase tolerance for ADL and work activities.  4. Pt to tolerate HEP to improve ROM and independence with ADL's     Long Term Goals: 8 weeks  1.Report decreased L shoulder/scapular pain  <   / =  2  /10  to increase tolerance for functional reaching  2. Increase UE/neck flexibility in order to improve posture.    3.Increased strength in B shoulders/scapular stabilizers to >/= 4/5 MMT grade to increase tolerance for ADL and work activities.  4. Increase strength in L elbow extensor to 4/5 MMT to increased tolerance to reaching and ADLs    PLAN   Plan of care Certification: 3/13/2023 to 6/5/2023.    Continue Outpatient Physical Therapy per patient tolerance and POC.     Winter Sosa, PT

## 2023-04-05 ENCOUNTER — CLINICAL SUPPORT (OUTPATIENT)
Dept: REHABILITATION | Facility: HOSPITAL | Age: 38
End: 2023-04-05
Payer: COMMERCIAL

## 2023-04-05 DIAGNOSIS — R29.898 SHOULDER WEAKNESS: ICD-10-CM

## 2023-04-05 DIAGNOSIS — M54.9 UPPER BACK PAIN ON LEFT SIDE: Primary | ICD-10-CM

## 2023-04-05 PROCEDURE — 97112 NEUROMUSCULAR REEDUCATION: CPT | Mod: PO,97

## 2023-04-05 PROCEDURE — 97530 THERAPEUTIC ACTIVITIES: CPT | Mod: PO,97

## 2023-04-05 PROCEDURE — 97140 MANUAL THERAPY 1/> REGIONS: CPT | Mod: PO,97

## 2023-04-05 PROCEDURE — 97110 THERAPEUTIC EXERCISES: CPT | Mod: PO

## 2023-04-05 NOTE — PROGRESS NOTES
"  [facility]   Physical Therapy Treatment Note     Name: Patricia Mireles [place]  Clinic Number: [id number]    Therapy Diagnosis:   Encounter Diagnoses   Name Primary?    Upper back pain on left side Yes    Shoulder weakness        Physician: Marzena Bey MD    Visit Date: 4/5/2023    Physician Orders: PT Eval and Treat   Medical Diagnosis from Referral: M54.9 (ICD-10-CM) - Upper back pain on left side  Evaluation Date: 3/13/2023  Authorization Period Expiration: 12/29/23  Plan of Care Expiration: 6/5/2023  Progress Note Due: 4/10/2023  Visit # / Visits authorized: 5/20  Precautions: Standard  PTA Visit #: 0/5    Time In: 7:02 am  Time Out: 8:00 am  Total Billable Time: 58 minutes    SUBJECTIVE     Patient reports "My arm isn't having the best morning today."    She was partially compliant with home exercise program due to painful attempts.  Response to previous treatment: improved pain and tingling  Functional change: sleeping better less pain    Pain: 3/10 aching  Location: medial to Left scap    OBJECTIVE     Objective Measures updated at progress report unless specified.    TREATMENT     Patricia received the treatments listed below:    Bold = completed    Therapeutic Activities to improve functional performance for 25 minutes, including:  -Upper Body Ergometer, 8 minutes (forwards/backwards), level 2.5   -Telugu get-ups (step one) 2x10 each side with 5# kettle bell  -Bird dogs 2x10  -1X10 Quadruped alternating shoulder extension  -Educated on use of lacrosse ball for trigger point release     Therapeutic Exercise to develop strength, endurance, flexibility, and posture for 15 minutes including:  -2X15 Standing I vs GTB  -2X15 StandingT/Y/W RTB   -S/L ER vs 3LB  -S/L open books vs 3LB     Neuromuscular Re-Education activities to improve: Coordination, Sense, and Rotator Cuff stability for 8 minutes. The following activities were included:  -Plank 3X30" on BOSU  -'s tip " nerve glide (radial) 2x10     manual therapy techniques: Joint mobilizations and Soft tissue Mobilization were applied to the: lower cervical spine for 10 minutes   -STM and trigger point release to subscapularis    -STM to cervical paraspinals, UT, LS, rhomboid   -Scapular mobilizations     Patient Education and Home Exercises     Home Exercises Provided and Patient Education Provided     Education provided:   - Exercise rationale  - Demonstrate and cuing for all exericse  - HEP review/update  - Locrosse ball trigger point release    Written Home Exercises Provided: Patient instructed to cont prior HEP. Exercises were reviewed and Patricia was able to demonstrate them prior to the end of the session.  Patricia demonstrated good  understanding of the education provided. See EMR under Patient Instructions for exercises provided during therapy sessions    ASSESSMENT     Patient progressing well with progression of core and postural strengthening. Pt continues to be limited by poor activation of scapular stabilizers especially with UE weightbearing activities that improves with cueing.    Patricia is progressing well towards her goals.   Pt prognosis is Good.     Pt will continue to benefit from skilled outpatient physical therapy to address the deficits listed in the problem list box on initial evaluation, provide pt/family education and to maximize pt's level of independence in the home and community environment.     Pt's spiritual, cultural and educational needs considered and pt agreeable to plan of care and goals.     Anticipated barriers to physical therapy: none    Goals:   Short Term Goals: 4 weeks  1.Report decreased L shoulder/scapular pain  <   / =  4  /10  to increase tolerance for with functional reaching  2. Increase cervical ROM by 5-10 degrees in order to perform ADLs with decreased difficulty.  3. Increase strength in B shoulders/scapular stabilizers by 1/3 MMT grade to increase tolerance for ADL  and work activities.  4. Pt to tolerate HEP to improve ROM and independence with ADL's     Long Term Goals: 8 weeks  1.Report decreased L shoulder/scapular pain  <   / =  2  /10  to increase tolerance for functional reaching  2. Increase UE/neck flexibility in order to improve posture.    3.Increased strength in B shoulders/scapular stabilizers to >/= 4/5 MMT grade to increase tolerance for ADL and work activities.  4. Increase strength in L elbow extensor to 4/5 MMT to increased tolerance to reaching and ADLs    PLAN   Plan of care Certification: 3/13/2023 to 6/5/2023.    Continue Outpatient Physical Therapy per patient tolerance and POC.     Witner Sosa, PT

## 2023-04-10 ENCOUNTER — CLINICAL SUPPORT (OUTPATIENT)
Dept: REHABILITATION | Facility: HOSPITAL | Age: 38
End: 2023-04-10
Payer: COMMERCIAL

## 2023-04-10 DIAGNOSIS — R29.898 SHOULDER WEAKNESS: ICD-10-CM

## 2023-04-10 DIAGNOSIS — M54.9 UPPER BACK PAIN ON LEFT SIDE: Primary | ICD-10-CM

## 2023-04-10 PROCEDURE — 97112 NEUROMUSCULAR REEDUCATION: CPT | Mod: PO

## 2023-04-10 PROCEDURE — 97530 THERAPEUTIC ACTIVITIES: CPT | Mod: PO

## 2023-04-10 PROCEDURE — 97110 THERAPEUTIC EXERCISES: CPT | Mod: PO

## 2023-04-10 NOTE — PROGRESS NOTES
"  [facility]   Physical Therapy Treatment Note     Name: Patricia [place]  Clinic Number: [id number]    Therapy Diagnosis:   Encounter Diagnoses   Name Primary?    Upper back pain on left side Yes    Shoulder weakness        Physician: Marzena Bey MD    Visit Date: 4/10/2023    Physician Orders: PT Eval and Treat   Medical Diagnosis from Referral: M54.9 (ICD-10-CM) - Upper back pain on left side  Evaluation Date: 3/13/2023  Authorization Period Expiration: 12/29/23  Plan of Care Expiration: 6/5/2023  Progress Note Due: 5/8/2023 (progress note updated on 4/10/2023)  Visit # / Visits authorized: 8/20  Precautions: Standard  PTA Visit #: 0/5    Time In: 7:02 am  Time Out: 8:00 am  Total Billable Time: 58 minutes    SUBJECTIVE     Patient reports "I held a small baby yesterday so my muscles are sore. But I don't have the nerve pain today."    She was partially compliant with home exercise program due to painful attempts.  Response to previous treatment: improved pain and tingling  Functional change: sleeping better less pain    Pain: 2/10 aching  Location: medial to Left scap    OBJECTIVE     Objective Measures updated at progress report unless specified.    Upper Extremity Strength  (R) UE   (L) UE     Shoulder flexion: 4/5 Shoulder flexion: 4/5   Shoulder Abduction: 4/5 Shoulder abduction: 4/5   Shoulder ER 4/5 Shoulder ER 4-/5   Shoulder IR 4/5 Shoulder IR 4/  5   Elbow flexion: 4/5 Elbow flexion: 4/5   Elbow extension: 4/5 Elbow extension: 3+/5   Wrist flexion: 4/5 Wrist flexion: 4/5   Wrist extension: 4/5 Wrist extension: 4/5    65 lbs : 65 lbs   Lower Trap 4/5 Lower Trap 4-/5   Middle Trap 4/5 Middle Trap 4-/5       TREATMENT     Patricia received the treatments listed below:    Bold = completed    Therapeutic Activities to improve functional performance for 25 minutes, including:  -Upper Body Ergometer, 8 minutes (forwards/backwards), level 2.5   -Hebrew get-ups " "(step one) 2x10 each side with 5# kettle bell  -Bird dogs 2x10  -Objective measures gathered as above    Therapeutic Exercise to develop strength, endurance, flexibility, and posture for 25 minutes including:  -2X15 Prone on PB T (2#)/ Y(1#)/ W(1#)/I (2#)   -2X12 Lateral wall push-away LUE  -2X12 Elbow extension vs 2lb (with towel at side) standing while RUE supported on table  -2X10 Pushup+ in quadruped     Neuromuscular Re-Education activities to improve: Coordination, Sense, and Rotator Cuff stability for 8 minutes. The following activities were included:  -Plank 3X30" on BOSU with reactive righting  -'s tip nerve glide (radial) 2x10     manual therapy techniques: Joint mobilizations and Soft tissue Mobilization were applied to the: lower cervical spine for -- minutes   -STM and trigger point release to subscapularis    -STM to cervical paraspinals, UT, LS, rhomboid   -Scapular mobilizations     Patient Education and Home Exercises     Home Exercises Provided and Patient Education Provided     Education provided:   - Exercise rationale  - Demonstrate and cuing for all exericse  - HEP review/update    Written Home Exercises Provided: Patient instructed to cont prior HEP. Exercises were reviewed and Patricia was able to demonstrate them prior to the end of the session.  Patricia demonstrated good  understanding of the education provided. See EMR under Patient Instructions for exercises provided during therapy sessions    ASSESSMENT     Patient re assessed today with noted improvement in LUE strength especially with scapular stabilization. Pt continues to be limited by decreased elbow extension strength and intermittent pain in shoulder and back of LUE. Pt also noted with improved pain at night time with improved sleep. Today's session focused on continued progression of shoulder and scapular stabilization and strengthening exercises. Pt limited significantly in L elbow extension strength and " endurance.    Patricia is progressing well towards her goals.   Pt prognosis is Good.     Pt will continue to benefit from skilled outpatient physical therapy to address the deficits listed in the problem list box on initial evaluation, provide pt/family education and to maximize pt's level of independence in the home and community environment.     Pt's spiritual, cultural and educational needs considered and pt agreeable to plan of care and goals.     Anticipated barriers to physical therapy: none    Goals:   Short Term Goals: 4 weeks  1.Report decreased L shoulder/scapular pain  <   / =  4  /10  to increase tolerance for with functional reaching -MET  2. Increase cervical ROM by 5-10 degrees in order to perform ADLs with decreased difficulty. -MET  3. Increase strength in B shoulders/scapular stabilizers by 1/3 MMT grade to increase tolerance for ADL and work activities. -MET  4. Pt to tolerate HEP to improve ROM and independence with ADL's -MET     Long Term Goals: 8 weeks  1.Report decreased L shoulder/scapular pain  <   / =  2  /10  to increase tolerance for functional reaching  -progressing  2. Increase UE/neck flexibility in order to improve posture.  -progressing  3.Increased strength in B shoulders/scapular stabilizers to >/= 4/5 MMT grade to increase tolerance for ADL and work activities. -progressing  4. Increase strength in L elbow extensor to 4/5 MMT to increased tolerance to reaching and ADLs -progressing    PLAN   Plan of care Certification: 3/13/2023 to 6/5/2023.    Continue Outpatient Physical Therapy per patient tolerance and POC.     Winter Sosa, PT

## 2023-04-12 ENCOUNTER — CLINICAL SUPPORT (OUTPATIENT)
Dept: REHABILITATION | Facility: HOSPITAL | Age: 38
End: 2023-04-12
Payer: COMMERCIAL

## 2023-04-12 DIAGNOSIS — R29.898 SHOULDER WEAKNESS: ICD-10-CM

## 2023-04-12 DIAGNOSIS — M54.9 UPPER BACK PAIN ON LEFT SIDE: Primary | ICD-10-CM

## 2023-04-12 PROCEDURE — 97140 MANUAL THERAPY 1/> REGIONS: CPT | Mod: PO

## 2023-04-12 PROCEDURE — 97112 NEUROMUSCULAR REEDUCATION: CPT | Mod: PO,97

## 2023-04-12 PROCEDURE — 97110 THERAPEUTIC EXERCISES: CPT | Mod: PO,97

## 2023-04-12 PROCEDURE — 97530 THERAPEUTIC ACTIVITIES: CPT | Mod: PO,97

## 2023-04-12 NOTE — PROGRESS NOTES
"  [facility]   Physical Therapy Treatment Note     Name: Patricia Mireles [place]  Clinic Number: [id number]    Therapy Diagnosis:   Encounter Diagnoses   Name Primary?    Upper back pain on left side Yes    Shoulder weakness        Physician: Marzena Bey MD    Visit Date: 4/12/2023    Physician Orders: PT Eval and Treat   Medical Diagnosis from Referral: M54.9 (ICD-10-CM) - Upper back pain on left side  Evaluation Date: 3/13/2023  Authorization Period Expiration: 12/29/23  Plan of Care Expiration: 6/5/2023  Progress Note Due: 5/8/2023 (progress note updated on 4/10/2023)  Visit # / Visits authorized: 9/20  Precautions: Standard  PTA Visit #: 0/5    Time In: 7:00 am  Time Out: 8:00 am  Total Billable Time: 60 minutes    SUBJECTIVE     Patient reports "I was a little sore, but overall I feel good."    She was partially compliant with home exercise program due to painful attempts.  Response to previous treatment: improved pain and tingling  Functional change: sleeping better less pain    Pain: 2/10 aching  Location: medial to Left scap    OBJECTIVE     Objective Measures updated at progress report unless specified.    TREATMENT     Patricia received the treatments listed below:    Bold = completed    Therapeutic Activities to improve functional performance for 25 minutes, including:  -Upper Body Ergometer, 12 minutes (forwards/backwards), level 2.5   -S[place] Arabian get-ups (step one) 2x15 each side with 5# kettle bell  -Bird dogs 2x15 vs RTB    Therapeutic Exercise to develop strength, endurance, flexibility, and posture for 25 minutes including:  -2X15 Prone on PB T/Y/W/I   -2X12 Lateral wall push-away LUE  -2x15 single UE shoulder horizontal ABD on wall vs RTB     Neuromuscular Re-Education activities to improve: Coordination, Sense, and Rotator Cuff stability for 13 minutes. The following activities were included:  -Plank 3X30" on BOSU with reactive righting  -Body Blade 2X30" Shoulder at " "90 flex vertical oscillations each side   -Body Blade 2X30" Shoulder at 90 flex horizontal oscillations each side    manual therapy techniques: Joint mobilizations and Soft tissue Mobilization were applied to the: lower cervical spine for 10 minutes   -STM and trigger point release to subscapularis    -STM to cervical paraspinals, UT, LS, rhomboid   -Scapular mobilizations     Patient Education and Home Exercises     Home Exercises Provided and Patient Education Provided     Education provided:   - Exercise rationale  - Demonstrate and cuing for all exericse  - HEP review/update    Written Home Exercises Provided: Patient instructed to cont prior HEP. Exercises were reviewed and Patricia was able to demonstrate them prior to the end of the session.  Patricia demonstrated good  understanding of the education provided. See EMR under Patient Instructions for exercises provided during therapy sessions    ASSESSMENT     Today's session focused on continued progression of shoulder and scapular stabilization and strengthening exercises. Pt limited significantly in L elbow extension strength and endurance.    Patricia is progressing well towards her goals.   Pt prognosis is Good.     Pt will continue to benefit from skilled outpatient physical therapy to address the deficits listed in the problem list box on initial evaluation, provide pt/family education and to maximize pt's level of independence in the home and community environment.     Pt's spiritual, cultural and educational needs considered and pt agreeable to plan of care and goals.     Anticipated barriers to physical therapy: none    Goals:   Short Term Goals: 4 weeks  1.Report decreased L shoulder/scapular pain  <   / =  4  /10  to increase tolerance for with functional reaching -MET  2. Increase cervical ROM by 5-10 degrees in order to perform ADLs with decreased difficulty. -MET  3. Increase strength in B shoulders/scapular stabilizers by 1/3 MMT grade to " increase tolerance for ADL and work activities. -MET  4. Pt to tolerate HEP to improve ROM and independence with ADL's -MET     Long Term Goals: 8 weeks  1.Report decreased L shoulder/scapular pain  <   / =  2  /10  to increase tolerance for functional reaching  -progressing  2. Increase UE/neck flexibility in order to improve posture.  -progressing  3.Increased strength in B shoulders/scapular stabilizers to >/= 4/5 MMT grade to increase tolerance for ADL and work activities. -progressing  4. Increase strength in L elbow extensor to 4/5 MMT to increased tolerance to reaching and ADLs -progressing    PLAN   Plan of care Certification: 3/13/2023 to 6/5/2023.    Continue Outpatient Physical Therapy per patient tolerance and POC.     Winter Sosa, PT

## 2023-04-18 NOTE — PROGRESS NOTES
"  [facility]   Physical Therapy Treatment Note     Name: Patricia Mireles [place]  Clinic Number: [id number]    Therapy Diagnosis:   Encounter Diagnoses   Name Primary?    Upper back pain on left side Yes    Shoulder weakness      Physician: Marzena Bey MD    Visit Date: 4/19/2023    Physician Orders: PT Eval and Treat   Medical Diagnosis from Referral: M54.9 (ICD-10-CM) - Upper back pain on left side  Evaluation Date: 3/13/2023  Authorization Period Expiration: 12/29/23  Plan of Care Expiration: 6/5/2023  Progress Note Due: 5/8/2023 (progress note updated on 4/10/2023)  Visit # / Visits authorized: 10/20  Precautions: Standard  PTA Visit #: 1/5    Time In: 0800  Time Out: 0900  Total Billable Time: 60 minutes    SUBJECTIVE     Patient reports "It still lets me know it's there."    She was partially compliant with home exercise program due to painful attempts.  Response to previous treatment: improved pain and tingling  Functional change: sleeping better, less pain    Pain: 2/10 aching  Location: medial to left scap, left arm     OBJECTIVE     Objective Measures updated at progress report unless specified.    TREATMENT     Patricia received the treatments listed below:      Therapeutic Activities to improve functional performance for 12 minutes, including:  -Upper Body Ergometer, 8 minutes (forwards/backwards), level 2.5   -Russian get-ups (step one) 2x15 with 5# kettle bell, bilateral    Therapeutic Exercise to develop strength, endurance, flexibility, and posture for 15 minutes including:  -2x10 Prone on PB T/Y/W/I vs 2#  -2x12 Lateral wall push-away LUE  -2x15 single UE shoulder horizontal ABD with contralateral isometric vs RTB, back against wall      Neuromuscular Re-Education activities to improve: Coordination, Sense, and Rotator Cuff stability for 23 minutes. The following activities were included:  -Plank 3x30" on BOSU with reactive righting  -Bird dogs 2x15 vs RTB  -Body " "Blade 2x30" Shoulder at 90 flex vertical oscillations, bilateral   -Body Blade 2x30" Shoulder at 90 flex horizontal oscillations, bilateral    Manual Therapy Techniques: Joint mobilizations and Soft tissue Mobilization were applied to the: lower cervical spine for 10 minutes   -STM and trigger point release to subscapularis    -STM to cervical paraspinals, UT, LS, rhomboid   -Scapular mobilizations     Patient Education and Home Exercises     Home Exercises Provided and Patient Education Provided     Education provided:   - Exercise rationale  - Demonstration and cuing for all exericse  - HEP review    Written Home Exercises Provided: Patient instructed to cont prior HEP. Exercises were reviewed and Patricia was able to demonstrate them prior to the end of the session.  Patricia demonstrated good  understanding of the education provided. See EMR under Patient Instructions for exercises provided during therapy sessions    ASSESSMENT     Patient appropriately challenged by her current POC, demonstrating increasing muscular fatigue throughout her visit with UE strengthening and stabilization exercises. We continue to provide pt with skilled treatment to further maximize her muscular endurance, posture, and overall function.     Patricia is progressing well towards her goals.   Pt prognosis is Good.     Pt will continue to benefit from skilled outpatient physical therapy to address the deficits listed in the problem list box on initial evaluation, provide pt/family education and to maximize pt's level of independence in the home and community environment.     Pt's spiritual, cultural and educational needs considered and pt agreeable to plan of care and goals.     Anticipated barriers to physical therapy: none    Goals:   Short Term Goals: 4 weeks  1.Report decreased L shoulder/scapular pain  <   / =  4  /10  to increase tolerance for with functional reaching -MET  2. Increase cervical ROM by 5-10 degrees in order to " perform ADLs with decreased difficulty. -MET  3. Increase strength in B shoulders/scapular stabilizers by 1/3 MMT grade to increase tolerance for ADL and work activities. -MET  4. Pt to tolerate HEP to improve ROM and independence with ADL's -MET     Long Term Goals: 8 weeks  1.Report decreased L shoulder/scapular pain  <   / =  2  /10  to increase tolerance for functional reaching  -progressing  2. Increase UE/neck flexibility in order to improve posture.  -progressing  3.Increased strength in B shoulders/scapular stabilizers to >/= 4/5 MMT grade to increase tolerance for ADL and work activities. -progressing  4. Increase strength in L elbow extensor to 4/5 MMT to increased tolerance to reaching and ADLs -progressing    PLAN   Plan of care Certification: 3/13/2023 to 6/5/2023.    Continue Outpatient Physical Therapy per patient tolerance and POC.     Carley Butterfield, PTA

## 2023-04-19 ENCOUNTER — CLINICAL SUPPORT (OUTPATIENT)
Dept: REHABILITATION | Facility: HOSPITAL | Age: 38
End: 2023-04-19
Payer: COMMERCIAL

## 2023-04-19 DIAGNOSIS — R29.898 SHOULDER WEAKNESS: ICD-10-CM

## 2023-04-19 DIAGNOSIS — M54.9 UPPER BACK PAIN ON LEFT SIDE: Primary | ICD-10-CM

## 2023-04-19 PROCEDURE — 97140 MANUAL THERAPY 1/> REGIONS: CPT | Mod: PO,CQ,97

## 2023-04-19 PROCEDURE — 97110 THERAPEUTIC EXERCISES: CPT | Mod: PO,CQ,97

## 2023-04-19 PROCEDURE — 97530 THERAPEUTIC ACTIVITIES: CPT | Mod: PO,CQ

## 2023-04-19 PROCEDURE — 97112 NEUROMUSCULAR REEDUCATION: CPT | Mod: PO,CQ

## 2023-04-26 ENCOUNTER — CLINICAL SUPPORT (OUTPATIENT)
Dept: REHABILITATION | Facility: HOSPITAL | Age: 38
End: 2023-04-26
Payer: COMMERCIAL

## 2023-04-26 DIAGNOSIS — M54.9 UPPER BACK PAIN ON LEFT SIDE: Primary | ICD-10-CM

## 2023-04-26 DIAGNOSIS — R29.898 SHOULDER WEAKNESS: ICD-10-CM

## 2023-04-26 PROCEDURE — 97110 THERAPEUTIC EXERCISES: CPT | Mod: PO

## 2023-04-26 PROCEDURE — 97530 THERAPEUTIC ACTIVITIES: CPT | Mod: PO,97

## 2023-04-26 NOTE — PROGRESS NOTES
"  [facility]   Physical Therapy Treatment and Progress Note     Name: Patricia Quezada  Clinic Number: [id number]    Therapy Diagnosis:   Encounter Diagnoses   Name Primary?    Upper back pain on left side Yes    Shoulder weakness      Physician: Marzena Bey MD  Visit Date: 4/26/2023    Physician Orders: PT Eval and Treat   Medical Diagnosis from Referral: M54.9 (ICD-10-CM) - Upper back pain on left side  Evaluation Date: 03/13/2023  Authorization Period Expiration: 12/29/23  Plan of Care Expiration: 06/05/2023  Progress Note Due: 05/26/2023  Visit # / Visits authorized: 11/20  Precautions: Standard  PTA Visit #: 0/5    Time In: 7:05 am  Time Out: 8:00 am  Total Billable Time: 55 minutes    SUBJECTIVE     Patient reports she feels 90% back to her prior level of function, and is only experiencing symptoms occasionally. She cites one examples where she was walking down the stairs and her shoulder "just suddenly ached and let me know it was there."    She was partially compliant with home exercise program due to painful attempts.    Response to previous treatment: improved pain and tingling  Functional change: sleeping better, less pain    Pain: 0/10 aching  Location: medial to left scap, left arm     OBJECTIVE     Observation: Bilateral scapular winging when attempting plank on BOSU ball.    Cervical Range of Motion:     Percent ROM Pain   Flexion WFL none   Extension WFL none   Right Rotation WFL none   Left Rotation WFL none   Right Side Bending WFL none   Left Side Bending WFL none     Upper Extremity Strength  (R) UE   (L) UE     Shoulder Flexion: 4+/5 Shoulder Flexion: 4+/5   Shoulder Abduction: 4+/5 Shoulder Abduction: 4+/5   Shoulder ER 4+/5 Shoulder ER 4+/5   Shoulder IR 5/5 Shoulder IR 5/5   Elbow Flexion: 5/5 Elbow Flexion: 5/5   Elbow Extension: 5/5 Elbow Extension: 3+/5   Lower Trap 4/5 Lower Trap 4/5   Middle Trap 4-/5 Middle Trap 4-/5     TREATMENT     Patricia" "received the treatments listed below:    Therapeutic Activities to improve functional performance for 30 minutes, including:  Subjective/Objective Measurements (10 minutes)  Upper Body Ergometer, 8 minutes (forwards/backwards), level 2.5  [place] get-ups (step one) 2x15 with 5# kettle bell, bilateral    Therapeutic Exercise to develop strength, endurance, flexibility, and posture for 25 minutes including:  Standing Triceps Extensions, 3x10, BTB  Standing Shoulder Extensions, 3x10, GTB  Prone I/Y/T Circuit, 3x10, no weight (unable to perform with proper technique when weighted)  Serratus Scap Pushup at Table, 3x10 with 3" hold    Not Performed:  2x12 Lateral wall push-away LUE  2x15 single UE shoulder horizontal ABD with contralateral isometric vs RTB, back against wall    Neuromuscular Re-Education activities to improve: Coordination, Sense, and Rotator Cuff stability for 00 minutes. The following activities were included:    Not Performed:  Plank 3x30" on BOSU with reactive righting  Bird dogs 2x15 vs RTB  Body Blade 2x30" Shoulder at 90 flex vertical oscillations, bilateral  Body Blade 2x30" Shoulder at 90 flex horizontal oscillations, bilateral    Patient Education and Home Exercises     Home Exercises Provided and Patient Education Provided   Education provided:   - HEP review    Written Home Exercises Provided: Patient instructed to cont prior HEP. Exercises were reviewed and Patricia was able to demonstrate them prior to the end of the session. Patricia demonstrated good  understanding of the education provided. See EMR under Patient Instructions for exercises provided during therapy sessions.    ASSESSMENT     Patricia Quezada is a 37 year old female referred to physical therapy due to left sided upper back pain. The patient has been seen for 11 visits following her initial evaluation on 03/13/2023 and has made good progress, meeting all of her short term and 2 of her 4 long term goals. She continues to " demonstrate significant gross weakness in her left elbow extensors when compared bilaterally, and moderate weakness in her lower and middle trapezius muscles. Patricia also demonstrates bilateral scapular winging when attempting to hold plank position, which she is partially able to correct. Subjectively she is reporting that she is 90% back to her prior level of function, and reports partial compliance with her home exercise program. Session today focused on instructing patient in updated home exercise program for triceps strength, shoulder extensor strength, and scapular strengthening, as well as patient education regarding current impairments and discussion about her treatment goals for the remainder of her plan of care. Provided patient with updated home exercise program at end of session which she verbalized understanding of.    Patricia is progressing well towards her goals.  Pt prognosis is Good.    Pt will continue to benefit from skilled outpatient physical therapy to address the deficits listed in the problem list box on initial evaluation, provide pt/family education and to maximize pt's level of independence in the home and community environment.    Pt's spiritual, cultural and educational needs considered and pt agreeable to plan of care and goals.  Anticipated barriers to physical therapy: none    Goals:   Short Term Goals: 4 weeks  Report decreased L shoulder/scapular pain </=4/10 to increase tolerance for with functional reaching (Met)  Increase cervical ROM by 5-10 degrees in order to perform ADLs with decreased difficulty (Met)  Increase strength in B shoulders/scapular stabilizers by 1/3 MMT grade to increase tolerance for ADL and work activities (Met)  Pt to tolerate HEP to improve ROM and independence with ADL's (Met)    Long Term Goals: 8 weeks  Report decreased L shoulder/scapular pain </=2/10  to increase tolerance for functional reaching (Met)  Increase UE/neck flexibility in order to  improve posture (Met)  Increased strength in B shoulders/scapular stabilizers to >/= 4/5 MMT grade to increase tolerance for ADL and work activities (Good Progress)  Increase strength in L elbow extensor to 4/5 MMT to increased tolerance to reaching and ADLs (Some Progress)    PLAN   Plan of care Certification: 3/13/2023 to 6/5/2023.    Reduce frequency of visits to 1x/week, and focus remaining visits/HEP on triceps, shoulder extensor, and scapular strengthening.    Umair Finley, PT

## 2023-05-02 NOTE — PROGRESS NOTES
"  [facility]   Physical Therapy Treatment     Name: Patricia Mireles Robeson  Clinic Number: [id number]    Therapy Diagnosis:   Encounter Diagnoses   Name Primary?    Upper back pain on left side Yes    Shoulder weakness      Physician: Marzena Bey MD  Visit Date: 5/3/2023    Physician Orders: PT Eval and Treat   Medical Diagnosis from Referral: M54.9 (ICD-10-CM) - Upper back pain on left side  Evaluation Date: 03/13/2023  Authorization Period Expiration: 12/29/23  Plan of Care Expiration: 06/05/2023  Progress Note Due: 05/26/2023  Visit # / Visits authorized: 12/20  Precautions: Standard  PTA Visit #: 1/5    Time In: 0800  Time Out: 0900  Total Billable Time: 60 minutes    SUBJECTIVE     Patient reports "It's been behaving more as of late. I don't have as much tingling."    She was partially compliant with home exercise program due to painful attempts.    Response to previous treatment: improved pain and tingling  Functional change: sleeping better, less pain    Pain: 0/10 aching  Location: medial to left scap, left arm     OBJECTIVE     Objective Measures updated at progress report unless specified.    TREATMENT     Patricia received the treatments listed below:    Therapeutic Activities to improve functional performance for 23 minutes, including:  Upper Body Ergometer, 8 minutes (forwards/backwards), level 2.5  [place] get-ups (step one) 2x15 with 5# kettle bell, bilateral    Therapeutic Exercise to develop strength, endurance, flexibility, and posture for 25 minutes including:  Standing Triceps Extensions, 3x10, BTB  Standing Shoulder Extensions, 3x10, GTB  Prone I/Y/T/W Circuit, 3x10 ea  Serratus Scap Pushup on wall, 3x10 with 3" hold  2x12 Lateral wall push-away LUE    Neuromuscular Re-Education activities to improve: Coordination, Sense, and Rotator Cuff stability for 12 minutes. The following activities were included:  2x15 single UE shoulder horizontal ABD with contralateral " "isometric vs RTB, back against wall, bilateral  Body Blade 2x30" Shoulder at 90 flex vertical oscillations, bilateral  Body Blade 2x30" Shoulder at 90 flex horizontal oscillations, bilateral    Not Performed:  Plank 3x30" on BOSU with reactive righting  Bird dogs 2x15 vs RTB    Patient Education and Home Exercises     Home Exercises Provided and Patient Education Provided   Education provided:   - HEP review    Written Home Exercises Provided: Patient instructed to cont prior HEP. Exercises were reviewed and Patricia was able to demonstrate them prior to the end of the session. Patricia demonstrated good  understanding of the education provided. See EMR under Patient Instructions for exercises provided during therapy sessions.    ASSESSMENT     Patient tolerates her program well today, while demonstrating increasing muscular fatigue as we continue to work BUE and scapular strengthening. She continues to require cuing to address scapular winging and has difficulty correcting this due to serratus weakness and would benefit from further skilled treatment.    Patricia is progressing well towards her goals.  Pt prognosis is Good.    Pt will continue to benefit from skilled outpatient physical therapy to address the deficits listed in the problem list box on initial evaluation, provide pt/family education and to maximize pt's level of independence in the home and community environment.    Pt's spiritual, cultural and educational needs considered and pt agreeable to plan of care and goals.  Anticipated barriers to physical therapy: none    Goals:   Short Term Goals: 4 weeks  Report decreased L shoulder/scapular pain </=4/10 to increase tolerance for with functional reaching (Met)  Increase cervical ROM by 5-10 degrees in order to perform ADLs with decreased difficulty (Met)  Increase strength in B shoulders/scapular stabilizers by 1/3 MMT grade to increase tolerance for ADL and work activities (Met)  Pt to tolerate HEP " to improve ROM and independence with ADL's (Met)    Long Term Goals: 8 weeks  Report decreased L shoulder/scapular pain </=2/10  to increase tolerance for functional reaching (Met)  Increase UE/neck flexibility in order to improve posture (Met)  Increased strength in B shoulders/scapular stabilizers to >/= 4/5 MMT grade to increase tolerance for ADL and work activities (Good Progress)  Increase strength in L elbow extensor to 4/5 MMT to increased tolerance to reaching and ADLs (Some Progress)    PLAN   Plan of care Certification: 3/13/2023 to 6/5/2023.    Continue outpatient physical therapy, 1x/week, with a focus on triceps, shoulder extensor, and scapular strengthening.    Carley Butterfield, PTA

## 2023-05-03 ENCOUNTER — CLINICAL SUPPORT (OUTPATIENT)
Dept: REHABILITATION | Facility: HOSPITAL | Age: 38
End: 2023-05-03
Payer: COMMERCIAL

## 2023-05-03 DIAGNOSIS — R29.898 SHOULDER WEAKNESS: ICD-10-CM

## 2023-05-03 DIAGNOSIS — M54.9 UPPER BACK PAIN ON LEFT SIDE: Primary | ICD-10-CM

## 2023-05-03 PROCEDURE — 97110 THERAPEUTIC EXERCISES: CPT | Mod: PO,CQ,97

## 2023-05-03 PROCEDURE — 97530 THERAPEUTIC ACTIVITIES: CPT | Mod: PO,CQ

## 2023-05-03 PROCEDURE — 97112 NEUROMUSCULAR REEDUCATION: CPT | Mod: PO,CQ

## 2023-05-09 NOTE — PROGRESS NOTES
"  [facility]   Physical Therapy Treatment     Name: Patricia Mireles [place]  Clinic Number: [id number]    Therapy Diagnosis:   Encounter Diagnoses   Name Primary?    Upper back pain on left side Yes    Shoulder weakness      Physician: Marzena Bey MD  Visit Date: 5/10/2023    Physician Orders: PT Eval and Treat   Medical Diagnosis from Referral: M54.9 (ICD-10-CM) - Upper back pain on left side  Evaluation Date: 03/13/2023  Authorization Period Expiration: 12/29/23  Plan of Care Expiration: 06/05/2023  Progress Note Due: 05/26/2023  Visit # / Visits authorized: 13/20  Precautions: Standard  PTA Visit #: 2/5    Time In: 0800  Time Out: 0900   Total Billable Time: 60 minutes    SUBJECTIVE     Patient reports her arm is still just slightly noticeable, but hasn't had any tingling.     She was partially compliant with home exercise program due to painful attempts.    Response to previous treatment: improved pain and tingling  Functional change: sleeping better, less pain    Pain: 0/10 aching  Location: medial to left scap, left arm     OBJECTIVE     Objective Measures updated at progress report unless specified.    TREATMENT     Patricia received the treatments listed below:    Therapeutic Activities to improve functional performance for 23 minutes, including:  Upper Body Ergometer, 8 minutes (forwards/backwards), level 2.5  [place] get-ups (step one) 2x15 with 5# kettle bell, bilateral  Overhead carry 5# KB, 3x40'     Therapeutic Exercise to develop strength, endurance, flexibility, and posture for 23 minutes including:  Standing Triceps Extensions, 3x10, BTB  Standing Shoulder Extensions, 3x10, GTB  Prone I/Y/T/W Circuit, 2x10 ea  Serratus Scap Pushup on wall, 3x10 with 3" hold  2x12 Lateral wall push-away LUE    Neuromuscular Re-Education activities to improve: Coordination, Sense, and Rotator Cuff stability for 14 minutes. The following activities were included:  Row to 90/90 ER,  RTB " "2x10  2x15 single UE shoulder horizontal ABD with contralateral isometric vs RTB, back against wall, bilateral  Body Blade 2x30" Shoulder at 90 flex vertical oscillations, bilateral  Body Blade 2x30" Shoulder at 90 flex horizontal oscillations, bilateral    Patient Education and Home Exercises     Home Exercises Provided and Patient Education Provided   Education provided:   - HEP review    Written Home Exercises Provided: Patient instructed to cont prior HEP. Exercises were reviewed and Patricia was able to demonstrate them prior to the end of the session. Patricia demonstrated good  understanding of the education provided. See EMR under Patient Instructions for exercises provided during therapy sessions.    ASSESSMENT     Patient able to initiate further LUE strengthening activities with appropriate increase in muscular fatigue upon completion. She continues to require most cuing for scapular retraction and serratus activation to achieve maximal gains. However, she responds well to cuing provided, but struggles with maintaining body mechanics with increasing fatigue.     Patricia is progressing well towards her goals.  Pt prognosis is Good.    Pt will continue to benefit from skilled outpatient physical therapy to address the deficits listed in the problem list box on initial evaluation, provide pt/family education and to maximize pt's level of independence in the home and community environment.    Pt's spiritual, cultural and educational needs considered and pt agreeable to plan of care and goals.  Anticipated barriers to physical therapy: none    Goals:   Short Term Goals: 4 weeks  Report decreased L shoulder/scapular pain </=4/10 to increase tolerance for with functional reaching (Met)  Increase cervical ROM by 5-10 degrees in order to perform ADLs with decreased difficulty (Met)  Increase strength in B shoulders/scapular stabilizers by 1/3 MMT grade to increase tolerance for ADL and work activities " (Met)  Pt to tolerate HEP to improve ROM and independence with ADL's (Met)    Long Term Goals: 8 weeks  Report decreased L shoulder/scapular pain </=2/10  to increase tolerance for functional reaching (Met)  Increase UE/neck flexibility in order to improve posture (Met)  Increased strength in B shoulders/scapular stabilizers to >/= 4/5 MMT grade to increase tolerance for ADL and work activities (Good Progress)  Increase strength in L elbow extensor to 4/5 MMT to increased tolerance to reaching and ADLs (Some Progress)    PLAN   Plan of care Certification: 3/13/2023 to 6/5/2023.    Continue outpatient physical therapy, 1x/week, with a focus on triceps, shoulder extensor, and scapular strengthening.    Carley Butterfield, PTA

## 2023-05-10 ENCOUNTER — CLINICAL SUPPORT (OUTPATIENT)
Dept: REHABILITATION | Facility: HOSPITAL | Age: 38
End: 2023-05-10
Payer: COMMERCIAL

## 2023-05-10 DIAGNOSIS — M54.9 UPPER BACK PAIN ON LEFT SIDE: Primary | ICD-10-CM

## 2023-05-10 DIAGNOSIS — R29.898 SHOULDER WEAKNESS: ICD-10-CM

## 2023-05-10 PROCEDURE — 97110 THERAPEUTIC EXERCISES: CPT | Mod: PO,CQ

## 2023-05-10 PROCEDURE — 97530 THERAPEUTIC ACTIVITIES: CPT | Mod: PO,CQ

## 2023-05-10 PROCEDURE — 97112 NEUROMUSCULAR REEDUCATION: CPT | Mod: PO,CQ

## 2023-05-12 ENCOUNTER — OFFICE VISIT (OUTPATIENT)
Dept: OBSTETRICS AND GYNECOLOGY | Facility: CLINIC | Age: 38
End: 2023-05-12
Payer: COMMERCIAL

## 2023-05-12 VITALS
DIASTOLIC BLOOD PRESSURE: 74 MMHG | SYSTOLIC BLOOD PRESSURE: 134 MMHG | WEIGHT: 152.75 LBS | BODY MASS INDEX: 25.42 KG/M2

## 2023-05-12 DIAGNOSIS — Z01.419 WELL WOMAN EXAM WITH ROUTINE GYNECOLOGICAL EXAM: ICD-10-CM

## 2023-05-12 PROCEDURE — 99999 PR PBB SHADOW E&M-EST. PATIENT-LVL III: CPT | Mod: PBBFAC,,, | Performed by: OBSTETRICS & GYNECOLOGY

## 2023-05-12 PROCEDURE — 3008F PR BODY MASS INDEX (BMI) DOCUMENTED: ICD-10-PCS | Mod: CPTII,S$GLB,, | Performed by: OBSTETRICS & GYNECOLOGY

## 2023-05-12 PROCEDURE — 1159F MED LIST DOCD IN RCRD: CPT | Mod: CPTII,S$GLB,, | Performed by: OBSTETRICS & GYNECOLOGY

## 2023-05-12 PROCEDURE — 87624 HPV HI-RISK TYP POOLED RSLT: CPT | Performed by: OBSTETRICS & GYNECOLOGY

## 2023-05-12 PROCEDURE — 3078F DIAST BP <80 MM HG: CPT | Mod: CPTII,S$GLB,, | Performed by: OBSTETRICS & GYNECOLOGY

## 2023-05-12 PROCEDURE — 99999 PR PBB SHADOW E&M-EST. PATIENT-LVL III: ICD-10-PCS | Mod: PBBFAC,,, | Performed by: OBSTETRICS & GYNECOLOGY

## 2023-05-12 PROCEDURE — 3008F BODY MASS INDEX DOCD: CPT | Mod: CPTII,S$GLB,, | Performed by: OBSTETRICS & GYNECOLOGY

## 2023-05-12 PROCEDURE — 3078F PR MOST RECENT DIASTOLIC BLOOD PRESSURE < 80 MM HG: ICD-10-PCS | Mod: CPTII,S$GLB,, | Performed by: OBSTETRICS & GYNECOLOGY

## 2023-05-12 PROCEDURE — 88175 CYTOPATH C/V AUTO FLUID REDO: CPT | Performed by: OBSTETRICS & GYNECOLOGY

## 2023-05-12 PROCEDURE — 99385 PREV VISIT NEW AGE 18-39: CPT | Mod: S$GLB,,, | Performed by: OBSTETRICS & GYNECOLOGY

## 2023-05-12 PROCEDURE — 99385 PR PREVENTIVE VISIT,NEW,18-39: ICD-10-PCS | Mod: S$GLB,,, | Performed by: OBSTETRICS & GYNECOLOGY

## 2023-05-12 PROCEDURE — 3075F PR MOST RECENT SYSTOLIC BLOOD PRESS GE 130-139MM HG: ICD-10-PCS | Mod: CPTII,S$GLB,, | Performed by: OBSTETRICS & GYNECOLOGY

## 2023-05-12 PROCEDURE — 1159F PR MEDICATION LIST DOCUMENTED IN MEDICAL RECORD: ICD-10-PCS | Mod: CPTII,S$GLB,, | Performed by: OBSTETRICS & GYNECOLOGY

## 2023-05-12 PROCEDURE — 3075F SYST BP GE 130 - 139MM HG: CPT | Mod: CPTII,S$GLB,, | Performed by: OBSTETRICS & GYNECOLOGY

## 2023-05-12 NOTE — PROGRESS NOTES
History & Physical  Gynecology      SUBJECTIVE:     Chief Complaint: Well Woman       History of Present Illness:  Patricia Quezada is a 37 y.o. female  here for annual routine Pap and checkup. Patient's last menstrual period was 04/15/2023..  She has no unusual complaints.      She describes her periods as regular, lasting 4-5 days. normal flow.  denies break through bleeding.   denies vaginal itching or irritation.  denies vaginal discharge.    She is not sexually active.   She uses no method for contraception.    History of abnormal pap: No  Last Pap: 2016  Last MM22, benign, marker placed.    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 Did 10 year f/u with oncologist and has been cleared     History reviewed. No pertinent surgical history.  OB History          0    Para   0    Term   0       0    AB   0    Living   0         SAB   0    IAB   0    Ectopic   0    Multiple   0    Live Births                   Family History   Problem Relation Age of Onset    Diabetes Father      Social History     Tobacco Use    Smoking status: Never    Smokeless tobacco: Never   Substance Use Topics    Alcohol use: Yes     Alcohol/week: 0.0 standard drinks     Comment: social    Drug use: No       No current outpatient medications on file.     No current facility-administered medications for this visit.         Review of Systems:  Review of Systems   Constitutional:  Negative for activity change, appetite change, chills, fatigue, fever and unexpected weight change.   Respiratory:  Negative for cough, shortness of breath and wheezing.    Cardiovascular:  Negative for chest pain and leg swelling.   Gastrointestinal:  Negative for abdominal pain, constipation, diarrhea, nausea and vomiting.   Endocrine: Negative for hair loss and hot  flashes.   Genitourinary:  Negative for decreased libido, dyspareunia, dysuria, frequency, menstrual problem, pelvic pain, vaginal bleeding, vaginal discharge and vaginal pain.   Integumentary:  Negative for acne, hair changes, nipple discharge and breast skin changes.   Neurological:  Negative for headaches.   Psychiatric/Behavioral:  Negative for sleep disturbance.    Breast: Negative for mastodynia, nipple discharge and skin changes     OBJECTIVE:     Physical Exam:  Physical Exam  Constitutional:       Appearance: She is well-developed.   HENT:      Head: Normocephalic and atraumatic.   Eyes:      General: No scleral icterus.        Right eye: No discharge.         Left eye: No discharge.      Conjunctiva/sclera: Conjunctivae normal.   Pulmonary:      Effort: Pulmonary effort is normal.      Breath sounds: No stridor.   Chest:      Chest wall: No mass or tenderness.   Breasts:     Breasts are symmetrical.      Right: No inverted nipple, mass, nipple discharge, skin change or tenderness.      Left: No inverted nipple, mass, nipple discharge, skin change or tenderness.   Abdominal:      General: There is no distension.      Palpations: Abdomen is soft.      Tenderness: There is no abdominal tenderness.   Genitourinary:     Labia:         Right: No rash, tenderness, lesion or injury.         Left: No rash, tenderness, lesion or injury.       Vagina: Normal.      Cervix: No cervical motion tenderness, discharge or friability.      Adnexa:         Right: No mass, tenderness or fullness.          Left: No mass, tenderness or fullness.        Comments: Normal external genitalia.  Normal hair distribution.  Urethral meatus normal. No cervical lesions or masses.  No vaginal bleeding noted.  No adnexal or uterine tenderness.  No palpable adnexal masses.  Musculoskeletal:         General: Normal range of motion.   Skin:     General: Skin is warm and dry.   Neurological:      Mental Status: She is alert and oriented to  person, place, and time.   Psychiatric:         Behavior: Behavior normal.         Thought Content: Thought content normal.         Judgment: Judgment normal.         ASSESSMENT:       ICD-10-CM ICD-9-CM    1. Well woman exam with routine gynecological exam  Z01.419 V72.31 Ambulatory referral/consult to Gynecology      Liquid-Based Pap Smear, Screening      HPV High Risk Genotypes, PCR             Plan:      Patricia was seen today for well woman.    Diagnoses and all orders for this visit:    Well woman exam with routine gynecological exam  -     Ambulatory referral/consult to Gynecology  -     Liquid-Based Pap Smear, Screening  -     HPV High Risk Genotypes, PCR  - Cotesting today  - MMG and Cscope not indicated at this time  - Declines contraception at this time.        Orders Placed This Encounter   Procedures    HPV High Risk Genotypes, PCR       No follow-ups on file.     Counseling time: 15 minutes    Kesha Guzman

## 2023-05-17 ENCOUNTER — CLINICAL SUPPORT (OUTPATIENT)
Dept: REHABILITATION | Facility: HOSPITAL | Age: 38
End: 2023-05-17
Payer: COMMERCIAL

## 2023-05-17 DIAGNOSIS — M54.9 UPPER BACK PAIN ON LEFT SIDE: Primary | ICD-10-CM

## 2023-05-17 DIAGNOSIS — R29.898 SHOULDER WEAKNESS: ICD-10-CM

## 2023-05-17 PROCEDURE — 97110 THERAPEUTIC EXERCISES: CPT | Mod: PO,CQ,97

## 2023-05-17 PROCEDURE — 97530 THERAPEUTIC ACTIVITIES: CPT | Mod: PO,CQ

## 2023-05-17 PROCEDURE — 97112 NEUROMUSCULAR REEDUCATION: CPT | Mod: PO,CQ,97

## 2023-05-17 NOTE — PROGRESS NOTES
"  [facility]   Physical Therapy Treatment     Name: Patricia Mireles [place]  Clinic Number: [id number]    Therapy Diagnosis:   Encounter Diagnoses   Name Primary?    Upper back pain on left side Yes    Shoulder weakness      Physician: Marzena Bey MD  Visit Date: 5/17/2023    Physician Orders: PT Eval and Treat   Medical Diagnosis from Referral: M54.9 (ICD-10-CM) - Upper back pain on left side  Evaluation Date: 03/13/2023  Authorization Period Expiration: 12/29/23  Plan of Care Expiration: 06/05/2023  Progress Note Due: 05/26/2023  Visit # / Visits authorized: 14/20  Precautions: Standard  PTA Visit #: 3/5    Time In: 0800  Time Out: 0900   Total Billable Time: 60 minutes    SUBJECTIVE     Patient reports no change from her last visit, but states she is "pretty good."     She was partially compliant with home exercise program due to painful attempts.    Response to previous treatment: improved pain and tingling  Functional change: sleeping better, less pain    Pain: 0/10 aching  Location: medial to left scap, left arm     OBJECTIVE     Objective Measures updated at progress report unless specified.    TREATMENT     Patricia received the treatments listed below:    Therapeutic Activities to improve functional performance for 23 minutes, including:  Upper Body Ergometer, 8 minutes (forwards/backwards), level 2.5  Lithuanian get-ups (step one) 2x15 with 5# kettle bell, bilateral  Overhead carry 5# KB, 3x40'     Therapeutic Exercise to develop strength, endurance, flexibility, and posture for 23 minutes including:  Standing Triceps Extensions, 3x10, BTB  Standing Shoulder Extensions, 3x10, BTB  Prone I/Y/T/W Circuit, 2x10 ea  Serratus Scap Pushup on wall, 3x10 with 3" hold  2x12 Lateral wall push-away LUE    Neuromuscular Re-Education activities to improve: Coordination, Sense, and Rotator Cuff stability for 14 minutes. The following activities were included:  Row to 90/90 ER,  RTB " "2x10  2x15 single UE shoulder horizontal ABD with contralateral isometric vs RTB, back against wall, bilateral  Body Blade 2x30" Shoulder at 90 flex vertical oscillations, bilateral  Body Blade 2x20" Shoulder at 90 flex horizontal oscillations, bilateral    Patient Education and Home Exercises     Home Exercises Provided and Patient Education Provided   Education provided:   - HEP review    Written Home Exercises Provided: Patient instructed to cont prior HEP. Exercises were reviewed and Patricia was able to demonstrate them prior to the end of the session. Patricia demonstrated good  understanding of the education provided. See EMR under Patient Instructions for exercises provided during therapy sessions.    ASSESSMENT     Patient requiring mild visual/verbal cuing for appropriate body mechanics throughout her program but responds well to instruction and is able to self correct. She is beginning to demonstrate greater serratus strength and proprioception present during pushup plus. Patient is understanding of her HEP and in the importance of compliance.     Patricia is progressing well towards her goals.  Pt prognosis is Good.    Pt will continue to benefit from skilled outpatient physical therapy to address the deficits listed in the problem list box on initial evaluation, provide pt/family education and to maximize pt's level of independence in the home and community environment.    Pt's spiritual, cultural and educational needs considered and pt agreeable to plan of care and goals.  Anticipated barriers to physical therapy: none    Goals:   Short Term Goals: 4 weeks  Report decreased L shoulder/scapular pain </=4/10 to increase tolerance for with functional reaching (Met)  Increase cervical ROM by 5-10 degrees in order to perform ADLs with decreased difficulty (Met)  Increase strength in B shoulders/scapular stabilizers by 1/3 MMT grade to increase tolerance for ADL and work activities (Met)  Pt to tolerate " HEP to improve ROM and independence with ADL's (Met)    Long Term Goals: 8 weeks  Report decreased L shoulder/scapular pain </=2/10  to increase tolerance for functional reaching (Met)  Increase UE/neck flexibility in order to improve posture (Met)  Increased strength in B shoulders/scapular stabilizers to >/= 4/5 MMT grade to increase tolerance for ADL and work activities (Good Progress)  Increase strength in L elbow extensor to 4/5 MMT to increased tolerance to reaching and ADLs (Some Progress)    PLAN   Plan of care Certification: 3/13/2023 to 6/5/2023.    Provided with final HEP today for hopeful discharge next visit.     Carley Butterfield, PTA

## 2023-05-18 LAB
HPV HR 12 DNA SPEC QL NAA+PROBE: NEGATIVE
HPV16 AG SPEC QL: NEGATIVE
HPV18 DNA SPEC QL NAA+PROBE: NEGATIVE

## 2023-05-19 LAB
FINAL PATHOLOGIC DIAGNOSIS: NORMAL
Lab: NORMAL

## 2023-05-24 ENCOUNTER — CLINICAL SUPPORT (OUTPATIENT)
Dept: REHABILITATION | Facility: HOSPITAL | Age: 38
End: 2023-05-24
Payer: COMMERCIAL

## 2023-05-24 ENCOUNTER — DOCUMENTATION ONLY (OUTPATIENT)
Dept: REHABILITATION | Facility: HOSPITAL | Age: 38
End: 2023-05-24

## 2023-05-24 DIAGNOSIS — M54.9 UPPER BACK PAIN ON LEFT SIDE: Primary | ICD-10-CM

## 2023-05-24 DIAGNOSIS — R29.898 SHOULDER WEAKNESS: ICD-10-CM

## 2023-05-24 PROCEDURE — 97530 THERAPEUTIC ACTIVITIES: CPT | Mod: PO

## 2023-05-24 PROCEDURE — 97112 NEUROMUSCULAR REEDUCATION: CPT | Mod: PO,97

## 2023-05-24 NOTE — PROGRESS NOTES
Outpatient Therapy Discharge Summary     Name: Patricia Quezada  Clinic Number: [id number]    Therapy Diagnosis:   Encounter Diagnoses   Name Primary?    Upper back pain on left side Yes    Shoulder weakness      Physician: Marzena Bey MD  Physician Orders: PT Eval and Treat  Medical Diagnosis: M54.9 (ICD-10-CM) - Upper back pain on left side  Evaluation Date: 03/13/2023    Date of Last Visit: 05/24/2023  Total Visits Received: 16  Cancelled Visits: 3  No Show Visits: 0    Assessment      Patricia Quezada is a 37 year old female referred to physical therapy due to left sided upper back pain. The patient has been seen for 15 visits following her initial evaluation on 03/13/2023 and has made excellent progress, meeting all of her short and long term goals at this time. The patient is no longer experiencing left sided scapular or triceps pain at rest, and reports only experiencing symptoms 2x/week with a 1/10 intensity. Subjectively she is reporting that she feels 95% back to her PLOF. Objectively she is demonstrating significantly improved strength from her last reassessment on 04/26/2023, specifically with shoulder flexion, abduction, external rotation, and left triceps extension. The patient has developed a long term home exercise program to continue on with independently to continue improving her strength, and has no further skilled physical therapy needs at this time.    Goals:  Short Term Goals: 4 weeks  Report decreased L shoulder/scapular pain </=4/10 to increase tolerance for with functional reaching (Met)  Increase cervical ROM by 5-10 degrees in order to perform ADLs with decreased difficulty (Met)  Increase strength in B shoulders/scapular stabilizers by 1/3 MMT grade to increase tolerance for ADL and work activities (Met)  Pt to tolerate HEP to improve ROM and independence with ADL's (Met)     Long Term Goals: 8 weeks  Report decreased L shoulder/scapular pain </=2/10  to increase tolerance  for functional reaching (Met)  Increase UE/neck flexibility in order to improve posture (Met)  Increased strength in B shoulders/scapular stabilizers to >/= 4/5 MMT grade to increase tolerance for ADL and work activities (Met)  Increase strength in L elbow extensor to 4/5 MMT to increased tolerance to reaching and ADLs (Met)    Discharge reason: Patient has completed the physician's prescription and has met all of her goals.    Plan     This patient is discharged from skilled physical therapy services to home with self care and final home exercise program.    Umair Finley, PT, DPT

## 2023-05-24 NOTE — PROGRESS NOTES
[facility]   Physical Therapy Treatment     Name: Patricia Mireles [place]  Clinic Number: [id number]    Therapy Diagnosis:   Encounter Diagnoses   Name Primary?    Upper back pain on left side Yes    Shoulder weakness      Physician: Marzena Bey MD  Visit Date: 5/24/2023    Physician Orders: PT Eval and Treat   Medical Diagnosis from Referral: M54.9 (ICD-10-CM) - Upper back pain on left side  Evaluation Date: 03/13/2023  Authorization Period Expiration: 12/29/23  Plan of Care Expiration: 06/05/2023  Progress Note Due: 05/26/2023  Visit # / Visits authorized: 15/20  Precautions: Standard  PTA Visit #: 0/5    Time In: 7:00 am  Time Out: 7:55 am  Total Billable Time: 55 minutes    SUBJECTIVE     Patient reports she feels 95% back to her PLOF. She continues to report occasional 1/10 pain in her left shoulder and down the back of her own, which she states is present 2x/week at most.    She was compliant with home exercise program.    Response to previous treatment: improved pain and tingling  Functional change: sleeping better, less pain    Pain: 0/10 aching  Location: Left scapula and triceps.    OBJECTIVE     Upper Extremity Strength:  (R) UE   (L) UE     Shoulder Flexion: 5/5 Shoulder Flexion: 5/5   Shoulder Abduction: 5/5 Shoulder Abduction: 5/5   Shoulder ER 5/5 Shoulder ER 5/5   Shoulder IR 5/5 Shoulder IR 5/5   Elbow Flexion: 5/5 Elbow Flexion: 5/5   Elbow Extension: 5/5 Elbow Extension: 4/5   Lower Trap 4/5 Lower Trap 4/5   Middle Trap 4/5 Middle Trap 4/5     TREATMENT     Patricia received the treatments listed below:    Therapeutic Activities to improve functional performance for 15 minutes, including:  Subjective/Objective Measurements (5 minutes)  Upper Body Ergometer, 8 minutes (forwards/backwards), level 2.5  Arm Bar 2x15 with 5# kettle bell, bilateral  Overhead Carry, 6x40', 5# kettle bell    Neuromuscular Re-Education activities to improve: Coordination, Sense, and  "Rotator Cuff stability for 40 minutes. The following activities were included:  Body Blade - Repeated ER/IR at 90 Degrees Elbow Flexion, 3x30"  Body Blade - Repeated Flexion/Extension at 90 Degrees Shoulder Flexion, 3x15"  Row + 90/90 External Rotation, 2x15, RTB  Standing Triceps Extensions, 3x15, BTB  Standing Shoulder Extensions, 3x10, BTB  Wall Pushups, 3x10    Patient Education and Home Exercises     Home Exercises Provided and Patient Education Provided   Education provided:   - Final home exercise program review    Written Home Exercises Provided: Patient instructed to cont prior HEP. Exercises were reviewed and Patricia was able to demonstrate them prior to the end of the session. Patricia demonstrated good  understanding of the education provided. See EMR under Patient Instructions for exercises provided during therapy sessions.    ASSESSMENT     Patricia Quezada is a 37 year old female referred to physical therapy due to left sided upper back pain. The patient has been seen for 15 visits following her initial evaluation on 03/13/2023 and has made excellent progress, meeting all of her short and long term goals at this time. The patient is no longer experiencing left sided scapular or triceps pain at rest, and reports only experiencing symptoms 2x/week with a 1/10 intensity. Subjectively she is reporting that she feels 95% back to her PLOF. Objectively she is demonstrating significantly improved strength from her last reassessment on 04/26/2023, specifically with shoulder flexion, abduction, external rotation, and left triceps extension. The patient has developed a long term home exercise program to continue on with independently to continue improving her strength, and has no further skilled physical therapy needs at this time.    Pt's spiritual, cultural and educational needs considered and pt agreeable to plan of care and goals.  Anticipated barriers to physical therapy: none    Goals:   Short Term " Goals: 4 weeks  Report decreased L shoulder/scapular pain </=4/10 to increase tolerance for with functional reaching (Met)  Increase cervical ROM by 5-10 degrees in order to perform ADLs with decreased difficulty (Met)  Increase strength in B shoulders/scapular stabilizers by 1/3 MMT grade to increase tolerance for ADL and work activities (Met)  Pt to tolerate HEP to improve ROM and independence with ADL's (Met)    Long Term Goals: 8 weeks  Report decreased L shoulder/scapular pain </=2/10  to increase tolerance for functional reaching (Met)  Increase UE/neck flexibility in order to improve posture (Met)  Increased strength in B shoulders/scapular stabilizers to >/= 4/5 MMT grade to increase tolerance for ADL and work activities (Met)  Increase strength in L elbow extensor to 4/5 MMT to increased tolerance to reaching and ADLs (Met)    PLAN     Discharge from skilled physical therapy services to home with self care and final home exercise program.    Umair Finley, PT, DPT

## 2023-05-26 ENCOUNTER — TELEPHONE (OUTPATIENT)
Dept: RADIOLOGY | Facility: HOSPITAL | Age: 38
End: 2023-05-26
Payer: COMMERCIAL

## 2023-05-26 ENCOUNTER — PATIENT MESSAGE (OUTPATIENT)
Dept: PRIMARY CARE CLINIC | Facility: CLINIC | Age: 38
End: 2023-05-26
Payer: COMMERCIAL

## 2023-05-26 NOTE — TELEPHONE ENCOUNTER
----- Message from Kym Espinoza sent at 5/26/2023 11:08 AM CDT -----  Regarding: returing call  Contact: [phone]  Pt returning call please call to discuss Further

## 2023-05-26 NOTE — TELEPHONE ENCOUNTER
----- Message from Chloe Ruiz sent at 5/26/2023  8:57 AM CDT -----  Dr. Marzena Bey has put in a referral for a Diagnostic Mammogram. Please assist in scheduling.    Abnormal finding on breast imaging [R92.8]    Thanks

## 2023-06-12 ENCOUNTER — HOSPITAL ENCOUNTER (OUTPATIENT)
Dept: RADIOLOGY | Facility: HOSPITAL | Age: 38
Discharge: HOME | End: 2023-06-12
Attending: STUDENT IN AN ORGANIZED HEALTH CARE EDUCATION/TRAINING PROGRAM
Payer: COMMERCIAL

## 2023-06-12 DIAGNOSIS — R92.8 ABNORMAL FINDING ON BREAST IMAGING: ICD-10-CM

## 2023-06-12 PROCEDURE — 77065 MAMMO DIGITAL DIAGNOSTIC RIGHT WITH TOMO: ICD-10-PCS | Mod: 26,RT,, | Performed by: RADIOLOGY

## 2023-06-12 PROCEDURE — 77061 BREAST TOMOSYNTHESIS UNI: CPT | Mod: TC,RT

## 2023-06-12 PROCEDURE — 77065 DX MAMMO INCL CAD UNI: CPT | Mod: 26,RT,, | Performed by: RADIOLOGY

## 2023-06-12 PROCEDURE — 77061 MAMMO DIGITAL DIAGNOSTIC RIGHT WITH TOMO: ICD-10-PCS | Mod: 26,RT,, | Performed by: RADIOLOGY

## 2023-06-12 PROCEDURE — 77061 BREAST TOMOSYNTHESIS UNI: CPT | Mod: 26,RT,, | Performed by: RADIOLOGY

## 2023-11-16 ENCOUNTER — OFFICE VISIT (OUTPATIENT)
Dept: PRIMARY CARE CLINIC | Facility: CLINIC | Age: 38
End: 2023-11-16
Payer: COMMERCIAL

## 2023-11-16 ENCOUNTER — LAB VISIT (OUTPATIENT)
Dept: LAB | Facility: HOSPITAL | Age: 38
End: 2023-11-16
Attending: STUDENT IN AN ORGANIZED HEALTH CARE EDUCATION/TRAINING PROGRAM
Payer: COMMERCIAL

## 2023-11-16 VITALS
TEMPERATURE: 99 F | HEIGHT: 65 IN | HEART RATE: 77 BPM | SYSTOLIC BLOOD PRESSURE: 114 MMHG | WEIGHT: 154.31 LBS | BODY MASS INDEX: 25.71 KG/M2 | DIASTOLIC BLOOD PRESSURE: 80 MMHG | OXYGEN SATURATION: 96 %

## 2023-11-16 DIAGNOSIS — H70.91 MASTOIDITIS OF RIGHT SIDE: ICD-10-CM

## 2023-11-16 DIAGNOSIS — Z13.1 SCREENING FOR DIABETES MELLITUS: ICD-10-CM

## 2023-11-16 DIAGNOSIS — Z00.00 ENCOUNTER FOR PREVENTATIVE ADULT HEALTH CARE EXAMINATION: ICD-10-CM

## 2023-11-16 DIAGNOSIS — Z82.3 FAMILY HISTORY OF STROKE OR TRANSIENT ISCHEMIC ATTACK IN FATHER: ICD-10-CM

## 2023-11-16 DIAGNOSIS — Z13.220 SCREENING FOR HYPERLIPIDEMIA: ICD-10-CM

## 2023-11-16 DIAGNOSIS — Z13.220 SCREENING FOR HYPERLIPIDEMIA: Primary | ICD-10-CM

## 2023-11-16 LAB
ALBUMIN SERPL BCP-MCNC: 3.8 G/DL (ref 3.5–5.2)
ALP SERPL-CCNC: 40 U/L (ref 55–135)
ALT SERPL W/O P-5'-P-CCNC: 24 U/L (ref 10–44)
ANION GAP SERPL CALC-SCNC: 9 MMOL/L (ref 8–16)
AST SERPL-CCNC: 19 U/L (ref 10–40)
BILIRUB SERPL-MCNC: 0.4 MG/DL (ref 0.1–1)
BUN SERPL-MCNC: 12 MG/DL (ref 6–20)
CALCIUM SERPL-MCNC: 9.3 MG/DL (ref 8.7–10.5)
CHLORIDE SERPL-SCNC: 106 MMOL/L (ref 95–110)
CHOLEST SERPL-MCNC: 177 MG/DL (ref 120–199)
CHOLEST/HDLC SERPL: 3.4 {RATIO} (ref 2–5)
CO2 SERPL-SCNC: 26 MMOL/L (ref 23–29)
CREAT SERPL-MCNC: 0.7 MG/DL (ref 0.5–1.4)
ERYTHROCYTE [DISTWIDTH] IN BLOOD BY AUTOMATED COUNT: 12.4 % (ref 11.5–14.5)
EST. GFR  (NO RACE VARIABLE): >60 ML/MIN/1.73 M^2
ESTIMATED AVG GLUCOSE: 103 MG/DL (ref 68–131)
GLUCOSE SERPL-MCNC: 95 MG/DL (ref 70–110)
HBA1C MFR BLD: 5.2 % (ref 4–5.6)
HCT VFR BLD AUTO: 39.8 % (ref 37–48.5)
HDLC SERPL-MCNC: 52 MG/DL (ref 40–75)
HDLC SERPL: 29.4 % (ref 20–50)
HGB BLD-MCNC: 12.9 G/DL (ref 12–16)
LDLC SERPL CALC-MCNC: 112 MG/DL (ref 63–159)
MCH RBC QN AUTO: 30 PG (ref 27–31)
MCHC RBC AUTO-ENTMCNC: 32.4 G/DL (ref 32–36)
MCV RBC AUTO: 93 FL (ref 82–98)
NONHDLC SERPL-MCNC: 125 MG/DL
PLATELET # BLD AUTO: 266 K/UL (ref 150–450)
PMV BLD AUTO: 10.5 FL (ref 9.2–12.9)
POTASSIUM SERPL-SCNC: 4.1 MMOL/L (ref 3.5–5.1)
PROT SERPL-MCNC: 7 G/DL (ref 6–8.4)
RBC # BLD AUTO: 4.3 M/UL (ref 4–5.4)
SODIUM SERPL-SCNC: 141 MMOL/L (ref 136–145)
TRIGL SERPL-MCNC: 65 MG/DL (ref 30–150)
WBC # BLD AUTO: 5.24 K/UL (ref 3.9–12.7)

## 2023-11-16 PROCEDURE — 80061 LIPID PANEL: CPT | Performed by: STUDENT IN AN ORGANIZED HEALTH CARE EDUCATION/TRAINING PROGRAM

## 2023-11-16 PROCEDURE — 1159F MED LIST DOCD IN RCRD: CPT | Mod: CPTII,S$GLB,, | Performed by: STUDENT IN AN ORGANIZED HEALTH CARE EDUCATION/TRAINING PROGRAM

## 2023-11-16 PROCEDURE — 36415 COLL VENOUS BLD VENIPUNCTURE: CPT | Mod: PN | Performed by: STUDENT IN AN ORGANIZED HEALTH CARE EDUCATION/TRAINING PROGRAM

## 2023-11-16 PROCEDURE — 3079F DIAST BP 80-89 MM HG: CPT | Mod: CPTII,S$GLB,, | Performed by: STUDENT IN AN ORGANIZED HEALTH CARE EDUCATION/TRAINING PROGRAM

## 2023-11-16 PROCEDURE — 1160F PR REVIEW ALL MEDS BY PRESCRIBER/CLIN PHARMACIST DOCUMENTED: ICD-10-PCS | Mod: CPTII,S$GLB,, | Performed by: STUDENT IN AN ORGANIZED HEALTH CARE EDUCATION/TRAINING PROGRAM

## 2023-11-16 PROCEDURE — 80053 COMPREHEN METABOLIC PANEL: CPT | Performed by: STUDENT IN AN ORGANIZED HEALTH CARE EDUCATION/TRAINING PROGRAM

## 2023-11-16 PROCEDURE — 3079F PR MOST RECENT DIASTOLIC BLOOD PRESSURE 80-89 MM HG: ICD-10-PCS | Mod: CPTII,S$GLB,, | Performed by: STUDENT IN AN ORGANIZED HEALTH CARE EDUCATION/TRAINING PROGRAM

## 2023-11-16 PROCEDURE — 99214 PR OFFICE/OUTPT VISIT, EST, LEVL IV, 30-39 MIN: ICD-10-PCS | Mod: S$GLB,,, | Performed by: STUDENT IN AN ORGANIZED HEALTH CARE EDUCATION/TRAINING PROGRAM

## 2023-11-16 PROCEDURE — 3074F SYST BP LT 130 MM HG: CPT | Mod: CPTII,S$GLB,, | Performed by: STUDENT IN AN ORGANIZED HEALTH CARE EDUCATION/TRAINING PROGRAM

## 2023-11-16 PROCEDURE — 99999 PR PBB SHADOW E&M-EST. PATIENT-LVL III: CPT | Mod: PBBFAC,,, | Performed by: STUDENT IN AN ORGANIZED HEALTH CARE EDUCATION/TRAINING PROGRAM

## 2023-11-16 PROCEDURE — 99214 OFFICE O/P EST MOD 30 MIN: CPT | Mod: S$GLB,,, | Performed by: STUDENT IN AN ORGANIZED HEALTH CARE EDUCATION/TRAINING PROGRAM

## 2023-11-16 PROCEDURE — 83036 HEMOGLOBIN GLYCOSYLATED A1C: CPT | Performed by: STUDENT IN AN ORGANIZED HEALTH CARE EDUCATION/TRAINING PROGRAM

## 2023-11-16 PROCEDURE — 1160F RVW MEDS BY RX/DR IN RCRD: CPT | Mod: CPTII,S$GLB,, | Performed by: STUDENT IN AN ORGANIZED HEALTH CARE EDUCATION/TRAINING PROGRAM

## 2023-11-16 PROCEDURE — 3008F BODY MASS INDEX DOCD: CPT | Mod: CPTII,S$GLB,, | Performed by: STUDENT IN AN ORGANIZED HEALTH CARE EDUCATION/TRAINING PROGRAM

## 2023-11-16 PROCEDURE — 85027 COMPLETE CBC AUTOMATED: CPT | Performed by: STUDENT IN AN ORGANIZED HEALTH CARE EDUCATION/TRAINING PROGRAM

## 2023-11-16 PROCEDURE — 3074F PR MOST RECENT SYSTOLIC BLOOD PRESSURE < 130 MM HG: ICD-10-PCS | Mod: CPTII,S$GLB,, | Performed by: STUDENT IN AN ORGANIZED HEALTH CARE EDUCATION/TRAINING PROGRAM

## 2023-11-16 PROCEDURE — 99999 PR PBB SHADOW E&M-EST. PATIENT-LVL III: ICD-10-PCS | Mod: PBBFAC,,, | Performed by: STUDENT IN AN ORGANIZED HEALTH CARE EDUCATION/TRAINING PROGRAM

## 2023-11-16 PROCEDURE — 1159F PR MEDICATION LIST DOCUMENTED IN MEDICAL RECORD: ICD-10-PCS | Mod: CPTII,S$GLB,, | Performed by: STUDENT IN AN ORGANIZED HEALTH CARE EDUCATION/TRAINING PROGRAM

## 2023-11-16 PROCEDURE — 3008F PR BODY MASS INDEX (BMI) DOCUMENTED: ICD-10-PCS | Mod: CPTII,S$GLB,, | Performed by: STUDENT IN AN ORGANIZED HEALTH CARE EDUCATION/TRAINING PROGRAM

## 2023-11-16 RX ORDER — AMOXICILLIN AND CLAVULANATE POTASSIUM 875; 125 MG/1; MG/1
1 TABLET, FILM COATED ORAL EVERY 12 HOURS
Qty: 10 TABLET | Refills: 0 | Status: SHIPPED | OUTPATIENT
Start: 2023-11-16 | End: 2023-11-21

## 2023-11-16 NOTE — PROGRESS NOTES
"  Primary Care  Office Visit - In Person  11/16/2023      HPI    Patient is a 38 y.o.   Patricia Quezada  has a past medical history of ALL (acute lymphoid leukemia) in remission (1990).    Patient presenting follow up    She has noticed redness swelling on the right side of her face/ear      Active Medications:  Current Outpatient Medications   Medication Instructions    amoxicillin-clavulanate 875-125mg (AUGMENTIN) 875-125 mg per tablet 1 tablet, Oral, Every 12 hours       Vitals:    11/16/23 0820   BP: 114/80   BP Location: Right arm   Pulse: 77   Temp: 98.8 °F (37.1 °C)   SpO2: 96%   Weight: 70 kg (154 lb 5.2 oz)   Height: 5' 5" (1.651 m)       Physical Exam  Vitals reviewed.   Constitutional:       General: She is not in acute distress.  HENT:      Head:      Jaw: Swelling present.      Right Ear: Tympanic membrane normal. Swelling and tenderness present.   Cardiovascular:      Rate and Rhythm: Normal rate and regular rhythm.   Pulmonary:      Effort: Pulmonary effort is normal.      Breath sounds: Normal breath sounds.   Abdominal:      General: Abdomen is flat. Bowel sounds are normal.      Palpations: Abdomen is soft.   Musculoskeletal:      Right lower leg: No edema.      Left lower leg: No edema.          Assessment and Plan     1. Screening for hyperlipidemia  -     Lipid Panel; Future; Expected date: 11/16/2023    2. Screening for diabetes mellitus  -     Hemoglobin A1C; Future; Expected date: 11/16/2023    3. Encounter for preventative adult health care examination  -     CBC Without Differential; Future; Expected date: 11/16/2023  -     Comprehensive Metabolic Panel; Future; Expected date: 11/16/2023    4. Mastoiditis of right side  Comments:  Patient has appointment with dentist in December  Orders:  -     amoxicillin-clavulanate 875-125mg (AUGMENTIN) 875-125 mg per tablet; Take 1 tablet by mouth every 12 (twelve) hours. for 5 days  Dispense: 10 tablet; Refill: 0    5. Family history of " stroke or transient ischemic attack in father                   Upcoming Scheduled Appointments and Follow Up:    Future Appointments   Date Time Provider Department Center   11/16/2023  9:15 AM LAB, [place] [facility] [place]       Follow Up [facility] (with who? when?): Follow up in about 6 months (around 5/16/2024).      Extended Emergency Contact Information  Primary Emergency Contact: Taqueria Christensen   [place]  Mobile Phone: [phone]  Relation: Relative      Marzena Bey MD   Internal Medicine  11/16/2023 - 8:43 AM    I spent a total of 18 minutes on the day of the visit.This includes face to face time and non-face to face time preparing to see the patient (eg, review of tests), obtaining and/or reviewing separately obtained history, documenting clinical information in the electronic or other health record, independently interpreting results and communicating results to the patient/family/caregiver, or care coordinator.    While patients have the right to access their medical record, it is essential to recognize that progress notes primarily serve as a means of communication among healthcare professionals.

## 2024-07-13 ENCOUNTER — OFFICE VISIT (OUTPATIENT)
Dept: URGENT CARE | Facility: CLINIC | Age: 39
End: 2024-07-13
Payer: COMMERCIAL

## 2024-07-13 VITALS
HEIGHT: 65 IN | OXYGEN SATURATION: 97 % | TEMPERATURE: 99 F | RESPIRATION RATE: 18 BRPM | HEART RATE: 99 BPM | BODY MASS INDEX: 25.66 KG/M2 | SYSTOLIC BLOOD PRESSURE: 122 MMHG | WEIGHT: 154 LBS | DIASTOLIC BLOOD PRESSURE: 83 MMHG

## 2024-07-13 DIAGNOSIS — U07.1 COVID: Primary | ICD-10-CM

## 2024-07-13 DIAGNOSIS — R05.9 COUGH, UNSPECIFIED TYPE: ICD-10-CM

## 2024-07-13 LAB
CTP QC/QA: YES
SARS-COV-2 AG RESP QL IA.RAPID: POSITIVE

## 2024-07-13 PROCEDURE — 87811 SARS-COV-2 COVID19 W/OPTIC: CPT | Mod: QW,S$GLB,, | Performed by: FAMILY MEDICINE

## 2024-07-13 PROCEDURE — 99214 OFFICE O/P EST MOD 30 MIN: CPT | Mod: S$GLB,,, | Performed by: FAMILY MEDICINE

## 2024-07-13 RX ORDER — NAPROXEN 500 MG/1
500 TABLET ORAL 2 TIMES DAILY WITH MEALS
Qty: 30 TABLET | Refills: 0 | Status: SHIPPED | OUTPATIENT
Start: 2024-07-13

## 2024-07-13 RX ORDER — PROMETHAZINE HYDROCHLORIDE AND DEXTROMETHORPHAN HYDROBROMIDE 6.25; 15 MG/5ML; MG/5ML
5 SYRUP ORAL EVERY 8 HOURS PRN
Qty: 180 ML | Refills: 0 | Status: SHIPPED | OUTPATIENT
Start: 2024-07-13 | End: 2024-07-23

## 2024-07-13 NOTE — PROGRESS NOTES
"Subjective:      Patient ID: Patricia Quezada is a 38 y.o. female.    Vitals:  height is 5' 5" (1.651 m) and weight is 69.9 kg (154 lb). Her oral temperature is 99.2 °F (37.3 °C). Her blood pressure is 122/83 and her pulse is 99. Her respiration is 18 and oxygen saturation is 97%.     Chief Complaint: Cough    Pt states that she is coming from [place].  She also states that she took a covid test on Monday and it was negative    Cough  This is a new problem. The current episode started in the past 7 days. The problem has been gradually worsening. The problem occurs constantly. The cough is Non-productive. Associated symptoms include nasal congestion, postnasal drip and a sore throat. Pertinent negatives include no chest pain, chills, ear congestion, ear pain, fever, headaches, heartburn, hemoptysis, myalgias, rash, rhinorrhea, shortness of breath, sweats, weight loss or wheezing. Nothing aggravates the symptoms. Treatments tried: ibuprofen, aleve. The treatment provided no relief. There is no history of asthma, bronchiectasis, bronchitis, COPD, emphysema, environmental allergies or pneumonia.       Constitution: Negative for chills and fever.   HENT:  Positive for postnasal drip and sore throat. Negative for ear pain.    Cardiovascular:  Negative for chest pain.   Respiratory:  Positive for cough. Negative for bloody sputum, shortness of breath and wheezing.    Gastrointestinal:  Negative for heartburn.   Musculoskeletal:  Negative for muscle ache.   Skin:  Negative for rash.   Allergic/Immunologic: Negative for environmental allergies.   Neurological:  Negative for headaches.      Objective:     Physical Exam   Constitutional: She is oriented to person, place, and time. She appears well-developed. She is cooperative.  Non-toxic appearance. She does not appear ill. No distress.   HENT:   Head: Normocephalic and atraumatic.   Ears:   Right Ear: Hearing, tympanic membrane and external ear normal. "   Left Ear: Hearing, tympanic membrane and external ear normal.   Nose: Nose normal. No mucosal edema, rhinorrhea or nasal deformity. No epistaxis. Right sinus exhibits no maxillary sinus tenderness and no frontal sinus tenderness. Left sinus exhibits no maxillary sinus tenderness and no frontal sinus tenderness.   Mouth/Throat: Uvula is midline, oropharynx is clear and moist and mucous membranes are normal. No trismus in the jaw. Normal dentition. No uvula swelling. No oropharyngeal exudate, posterior oropharyngeal edema or posterior oropharyngeal erythema.   Eyes: Conjunctivae and lids are normal. No scleral icterus.   Neck: Trachea normal and phonation normal. Neck supple. No edema present. No erythema present. No neck rigidity present.   Cardiovascular: Normal rate, regular rhythm, normal heart sounds and normal pulses.   Pulmonary/Chest: Effort normal and breath sounds normal. No respiratory distress. She has no decreased breath sounds. She has no rhonchi.   Abdominal: Normal appearance.   Musculoskeletal: Normal range of motion.         General: No deformity. Normal range of motion.   Neurological: She is alert and oriented to person, place, and time. She exhibits normal muscle tone. Coordination normal.   Skin: Skin is warm, dry, intact, not diaphoretic and not pale.   Psychiatric: Her speech is normal and behavior is normal. Judgment and thought content normal.   Nursing note and vitals reviewed.    Results for orders placed or performed in visit on 07/13/24   SARS Coronavirus 2 Antigen, POCT Manual Read   Result Value Ref Range    SARS Coronavirus 2 Antigen Positive (A) Negative     Acceptable Yes        Assessment:     1. COVID    2. Cough, unspecified type        Plan:       COVID  -     naproxen (NAPROSYN) 500 MG tablet; Take 1 tablet (500 mg total) by mouth 2 (two) times daily with meals.  Dispense: 30 tablet; Refill: 0  -     promethazine-dextromethorphan (PROMETHAZINE-DM) 6.25-15 mg/5  mL Syrp; Take 5 mLs by mouth every 8 (eight) hours as needed.  Dispense: 180 mL; Refill: 0    Cough, unspecified type  -     SARS Coronavirus 2 Antigen, POCT Manual Read    Thank you for choosing [facility]!     Our goal in the Urgent Care is to always provide outstanding medical care. You may receive a survey by mail or e-mail in the next week regarding your experience today. We would greatly appreciate you completing and returning the survey. Your feedback provides us with a way to recognize our staff who provide very good care, and it helps us learn how to improve when your experience was below our aspiration of excellence.       We appreciate you trusting us with your medical care. We hope you feel better soon. We will be happy to take care of you for all of your future medical needs.  You must understand that you've received an Urgent Care treatment only and that you may be released before all your medical problems are known or treated. You, the patient, will arrange for follow up care as instructed.  Follow up with your PCP or specialty clinic as directed in the next 1-2 weeks if not improved or as needed.  You can call [phone] to schedule an appointment with the appropriate provider.  Another option is to follow up with [facility] ([email or web address]) virtually for quick simple medical advice.  If your condition worsens we recommend that you receive another evaluation at the emergency room immediately or contact your primary medical clinics after hours call service to discuss your concerns.  Please return here or go to the Emergency Department for any concerns or worsening of condition.      *If you were prescribed a narcotic or controlled medication, do not drive or operate heavy equipment or machinery while taking these medications.            Medical Decision Making:   Urgent Care Management:  Pt does not met the criteria for Paxlovid.  Will do symptomatic treatment

## 2024-12-30 ENCOUNTER — LAB VISIT (OUTPATIENT)
Dept: LAB | Facility: HOSPITAL | Age: 39
End: 2024-12-30
Attending: STUDENT IN AN ORGANIZED HEALTH CARE EDUCATION/TRAINING PROGRAM
Payer: COMMERCIAL

## 2024-12-30 ENCOUNTER — OFFICE VISIT (OUTPATIENT)
Dept: PRIMARY CARE CLINIC | Facility: CLINIC | Age: 39
End: 2024-12-30
Payer: COMMERCIAL

## 2024-12-30 VITALS
DIASTOLIC BLOOD PRESSURE: 82 MMHG | OXYGEN SATURATION: 97 % | TEMPERATURE: 99 F | HEIGHT: 65 IN | SYSTOLIC BLOOD PRESSURE: 124 MMHG | WEIGHT: 152.13 LBS | BODY MASS INDEX: 25.34 KG/M2 | HEART RATE: 106 BPM

## 2024-12-30 DIAGNOSIS — Z13.1 SCREENING FOR DIABETES MELLITUS: ICD-10-CM

## 2024-12-30 DIAGNOSIS — Z13.220 SCREENING FOR HYPERLIPIDEMIA: ICD-10-CM

## 2024-12-30 DIAGNOSIS — Z00.00 ENCOUNTER FOR PREVENTATIVE ADULT HEALTH CARE EXAMINATION: ICD-10-CM

## 2024-12-30 DIAGNOSIS — Z00.00 ENCOUNTER FOR PREVENTATIVE ADULT HEALTH CARE EXAMINATION: Primary | ICD-10-CM

## 2024-12-30 DIAGNOSIS — Z85.6 HISTORY OF ACUTE LYMPHOBLASTIC LEUKEMIA (ALL): ICD-10-CM

## 2024-12-30 PROBLEM — M54.9 UPPER BACK PAIN ON LEFT SIDE: Status: RESOLVED | Noted: 2023-03-13 | Resolved: 2024-12-30

## 2024-12-30 LAB
ALBUMIN SERPL BCP-MCNC: 4 G/DL (ref 3.5–5.2)
ALP SERPL-CCNC: 41 U/L (ref 40–150)
ALT SERPL W/O P-5'-P-CCNC: 21 U/L (ref 10–44)
ANION GAP SERPL CALC-SCNC: 6 MMOL/L (ref 8–16)
AST SERPL-CCNC: 16 U/L (ref 10–40)
BILIRUB SERPL-MCNC: 0.5 MG/DL (ref 0.1–1)
BUN SERPL-MCNC: 12 MG/DL (ref 6–20)
CALCIUM SERPL-MCNC: 9.5 MG/DL (ref 8.7–10.5)
CHLORIDE SERPL-SCNC: 106 MMOL/L (ref 95–110)
CHOLEST SERPL-MCNC: 174 MG/DL (ref 120–199)
CHOLEST/HDLC SERPL: 3.6 {RATIO} (ref 2–5)
CO2 SERPL-SCNC: 26 MMOL/L (ref 23–29)
CREAT SERPL-MCNC: 0.7 MG/DL (ref 0.5–1.4)
ERYTHROCYTE [DISTWIDTH] IN BLOOD BY AUTOMATED COUNT: 12.2 % (ref 11.5–14.5)
EST. GFR  (NO RACE VARIABLE): >60 ML/MIN/1.73 M^2
ESTIMATED AVG GLUCOSE: 105 MG/DL (ref 68–131)
GLUCOSE SERPL-MCNC: 94 MG/DL (ref 70–110)
HBA1C MFR BLD: 5.3 % (ref 4–5.6)
HCT VFR BLD AUTO: 40.2 % (ref 37–48.5)
HDLC SERPL-MCNC: 48 MG/DL (ref 40–75)
HDLC SERPL: 27.6 % (ref 20–50)
HGB BLD-MCNC: 13 G/DL (ref 12–16)
LDLC SERPL CALC-MCNC: 112.2 MG/DL (ref 63–159)
MCH RBC QN AUTO: 30.2 PG (ref 27–31)
MCHC RBC AUTO-ENTMCNC: 32.3 G/DL (ref 32–36)
MCV RBC AUTO: 94 FL (ref 82–98)
NONHDLC SERPL-MCNC: 126 MG/DL
PLATELET # BLD AUTO: 241 K/UL (ref 150–450)
PMV BLD AUTO: 10.3 FL (ref 9.2–12.9)
POTASSIUM SERPL-SCNC: 4.1 MMOL/L (ref 3.5–5.1)
PROT SERPL-MCNC: 7.6 G/DL (ref 6–8.4)
RBC # BLD AUTO: 4.3 M/UL (ref 4–5.4)
SODIUM SERPL-SCNC: 138 MMOL/L (ref 136–145)
TRIGL SERPL-MCNC: 69 MG/DL (ref 30–150)
TSH SERPL DL<=0.005 MIU/L-ACNC: 1.22 UIU/ML (ref 0.4–4)
WBC # BLD AUTO: 6.58 K/UL (ref 3.9–12.7)

## 2024-12-30 PROCEDURE — 84443 ASSAY THYROID STIM HORMONE: CPT | Performed by: STUDENT IN AN ORGANIZED HEALTH CARE EDUCATION/TRAINING PROGRAM

## 2024-12-30 PROCEDURE — 83036 HEMOGLOBIN GLYCOSYLATED A1C: CPT | Performed by: STUDENT IN AN ORGANIZED HEALTH CARE EDUCATION/TRAINING PROGRAM

## 2024-12-30 PROCEDURE — 80061 LIPID PANEL: CPT | Performed by: STUDENT IN AN ORGANIZED HEALTH CARE EDUCATION/TRAINING PROGRAM

## 2024-12-30 PROCEDURE — 1160F RVW MEDS BY RX/DR IN RCRD: CPT | Mod: CPTII,S$GLB,, | Performed by: STUDENT IN AN ORGANIZED HEALTH CARE EDUCATION/TRAINING PROGRAM

## 2024-12-30 PROCEDURE — 99999 PR PBB SHADOW E&M-EST. PATIENT-LVL III: CPT | Mod: PBBFAC,,, | Performed by: STUDENT IN AN ORGANIZED HEALTH CARE EDUCATION/TRAINING PROGRAM

## 2024-12-30 PROCEDURE — 99395 PREV VISIT EST AGE 18-39: CPT | Mod: S$GLB,,, | Performed by: STUDENT IN AN ORGANIZED HEALTH CARE EDUCATION/TRAINING PROGRAM

## 2024-12-30 PROCEDURE — 36415 COLL VENOUS BLD VENIPUNCTURE: CPT | Mod: PN | Performed by: STUDENT IN AN ORGANIZED HEALTH CARE EDUCATION/TRAINING PROGRAM

## 2024-12-30 PROCEDURE — 3074F SYST BP LT 130 MM HG: CPT | Mod: CPTII,S$GLB,, | Performed by: STUDENT IN AN ORGANIZED HEALTH CARE EDUCATION/TRAINING PROGRAM

## 2024-12-30 PROCEDURE — 85027 COMPLETE CBC AUTOMATED: CPT | Performed by: STUDENT IN AN ORGANIZED HEALTH CARE EDUCATION/TRAINING PROGRAM

## 2024-12-30 PROCEDURE — 3079F DIAST BP 80-89 MM HG: CPT | Mod: CPTII,S$GLB,, | Performed by: STUDENT IN AN ORGANIZED HEALTH CARE EDUCATION/TRAINING PROGRAM

## 2024-12-30 PROCEDURE — 3008F BODY MASS INDEX DOCD: CPT | Mod: CPTII,S$GLB,, | Performed by: STUDENT IN AN ORGANIZED HEALTH CARE EDUCATION/TRAINING PROGRAM

## 2024-12-30 PROCEDURE — 1159F MED LIST DOCD IN RCRD: CPT | Mod: CPTII,S$GLB,, | Performed by: STUDENT IN AN ORGANIZED HEALTH CARE EDUCATION/TRAINING PROGRAM

## 2024-12-30 PROCEDURE — 80053 COMPREHEN METABOLIC PANEL: CPT | Performed by: STUDENT IN AN ORGANIZED HEALTH CARE EDUCATION/TRAINING PROGRAM

## 2024-12-30 NOTE — PROGRESS NOTES
"Primary Care  Office Visit - In Person  12/30/2024      HPI    Patient is a 39 y.o.   Patricia Quezada  has a past medical history of ALL (acute lymphoid leukemia) in remission (1990).    History of Present Illness    CHIEF COMPLAINT:  Patient presents today for annual visit. She has no acute complaints today           Active Medications:  No current outpatient medications    Vitals:    12/30/24 0955   BP: 124/82   BP Location: Right arm   Patient Position: Sitting   Pulse: 106   Temp: 98.7 °F (37.1 °C)   SpO2: 97%   Weight: 69 kg (152 lb 1.9 oz)   Height: 5' 5" (1.651 m)       Physical Exam  Vitals reviewed.   Constitutional:       General: She is not in acute distress.  Cardiovascular:      Rate and Rhythm: Normal rate and regular rhythm.      Pulses: Normal pulses.      Heart sounds: Normal heart sounds.   Pulmonary:      Effort: Pulmonary effort is normal.      Breath sounds: Normal breath sounds.   Abdominal:      General: Abdomen is flat. Bowel sounds are normal.      Palpations: Abdomen is soft.   Musculoskeletal:      Right lower leg: No edema.      Left lower leg: No edema.   Neurological:      General: No focal deficit present.      Mental Status: She is oriented to person, place, and time.            Assessment & Plan        ENCOUNTER FOR PREVENTIVE CARE:  CBC  CMP  TSH    SCREENING HLD:  Lipid panel     SCREENING DM:  HbA1C    HX ALL:   Plan for mammogram in June   Find out site of RT       Orders Placed This Encounter    Lipid Panel    Hemoglobin A1C    CBC Without Differential    Comprehensive Metabolic Panel    TSH         Previous labs, records, and notes reviewed and considered for their impact on clinical decision making today.                Upcoming Scheduled Appointments and Follow Up:    Future Appointments   Date Time Provider Department Center   12/30/2024 11:15 AM [facility] [place]       Follow Up [facility] (with who? when?): No follow-ups on " file.      Extended Emergency Contact Information  Primary Emergency Contact: Taqueria Christensen   [place]  Mobile Phone: [phone]  Relation: Relative      Marzena Bey MD   Internal Medicine  12/30/2024 - 10:30 AM    I spent a total of 20 minutes on the day of the visit.This includes face to face time and non-face to face time preparing to see the patient (eg, review of tests), obtaining and/or reviewing separately obtained history, documenting clinical information in the electronic or other health record, independently interpreting results and communicating results to the patient/family/caregiver, or care coordinator.    This note was generated with the assistance of ambient listening technology. Verbal consent was obtained by the patient and accompanying visitor(s) for the recording of patient appointment to facilitate this note. I attest to having reviewed and edited the generated note for accuracy, though some syntax or spelling errors may persist. Please contact the author of this note for any clarification.      While patients have the right to access their medical record, it is essential to recognize that progress notes primarily serve as a means of communication among healthcare professionals.

## 2025-04-29 ENCOUNTER — OFFICE VISIT (OUTPATIENT)
Dept: DERMATOLOGY | Facility: CLINIC | Age: 40
End: 2025-04-29
Payer: COMMERCIAL

## 2025-04-29 DIAGNOSIS — D23.9 DERMATOFIBROMA: ICD-10-CM

## 2025-04-29 DIAGNOSIS — Z12.83 SKIN CANCER SCREENING: Primary | ICD-10-CM

## 2025-04-29 DIAGNOSIS — D22.9 MULTIPLE MELANOCYTIC NEVI: ICD-10-CM

## 2025-04-29 PROCEDURE — 99203 OFFICE O/P NEW LOW 30 MIN: CPT | Mod: S$GLB,,, | Performed by: DERMATOLOGY

## 2025-04-29 PROCEDURE — 1159F MED LIST DOCD IN RCRD: CPT | Mod: CPTII,S$GLB,, | Performed by: DERMATOLOGY

## 2025-04-29 PROCEDURE — 99999 PR PBB SHADOW E&M-EST. PATIENT-LVL I: CPT | Mod: PBBFAC,,, | Performed by: DERMATOLOGY

## 2025-04-29 NOTE — PROGRESS NOTES
Subjective:      Patient ID:  Patricia Quezada is a 39 y.o. female who presents for   Chief Complaint   Patient presents with    Skin Check     HPI  Patricia Quezada is a 39 y.o. female who presents for: FBSE screening exam for skin cancer.    New patient    The patient has no specific concerns today.  Pt has general questions about her right shoulder/chest    Pertinent history:  History of blistering sunburns: Yes  History of tanning bed use: No  Family history of melanoma: No  Personal history of mole removal: No  Personal history of skin cancer: No     No hx of NMSC    Review of Systems   Skin:  Positive for activity-related sunscreen use. Negative for daily sunscreen use, recent sunburn and wears hat.   Hematologic/Lymphatic: Does not bruise/bleed easily.       Objective:   Physical Exam   Constitutional: She appears well-developed and well-nourished. No distress.   Neurological: She is alert and oriented to person, place, and time. She is not disoriented.   Psychiatric: She has a normal mood and affect.   Skin:   Areas Examined (abnormalities noted in diagram):   Scalp / Hair Palpated and Inspected  Head / Face Inspection Performed  Neck Inspection Performed  Chest / Axilla Inspection Performed  Abdomen Inspection Performed  Genitals / Buttocks / Groin Inspection Performed  Back Inspection Performed  RUE Inspected  LUE Inspection Performed  RLE Inspected  LLE Inspection Performed  Nails and Digits Inspection Performed                 Diagram Legend     Erythematous scaling macule/papule c/w actinic keratosis       Vascular papule c/w angioma      Pigmented verrucoid papule/plaque c/w seborrheic keratosis      Yellow umbilicated papule c/w sebaceous hyperplasia      Irregularly shaped tan macule c/w lentigo     1-2 mm smooth white papules consistent with Milia      Movable subcutaneous cyst with punctum c/w epidermal inclusion cyst      Subcutaneous movable cyst c/w pilar cyst      Firm pink  to brown papule c/w dermatofibroma      Pedunculated fleshy papule(s) c/w skin tag(s)      Evenly pigmented macule c/w junctional nevus     Mildly variegated pigmented, slightly irregular-bordered macule c/w mildly atypical nevus      Flesh colored to evenly pigmented papule c/w intradermal nevus       Pink pearly papule/plaque c/w basal cell carcinoma      Erythematous hyperkeratotic cursted plaque c/w SCC      Surgical scar with no sign of skin cancer recurrence      Open and closed comedones      Inflammatory papules and pustules      Verrucoid papule consistent consistent with wart     Erythematous eczematous patches and plaques     Dystrophic onycholytic nail with subungual debris c/w onychomycosis     Umbilicated papule    Erythematous-base heme-crusted tan verrucoid plaque consistent with inflamed seborrheic keratosis     Erythematous Silvery Scaling Plaque c/w Psoriasis     See annotation      Assessment / Plan:        Skin cancer screening  Total body skin examination performed today including at least 12 points as noted in physical examination. No lesions suspicious for malignancy noted.    Recommend daily sun protection/avoidance, use of at least SPF 30, broad spectrum sunscreen (OTC drug), skin self examinations, and routine physician surveillance to optimize early detection    Recommend patient follow up for TBSE every few years. Counseled patient to please reach out if she notices any new, non-healing, bleeding or otherwise concerning lesions    Dermatofibroma  This is a benign scar-like lesion secondary to minor trauma. No treatment required.     Multiple melanocytic nevi  Discussed ABCDE's of nevi.  Monitor for new mole or moles that are becoming bigger, darker, irritated, or developing irregular borders. Brochure provided. Instructed patient to observe lesion(s) for changes and follow up in clinic if changes are noted. Patient to monitor skin at home for new or changing lesions.           No  follow-ups on file.

## 2025-06-13 ENCOUNTER — PATIENT MESSAGE (OUTPATIENT)
Dept: PRIMARY CARE CLINIC | Facility: CLINIC | Age: 40
End: 2025-06-13
Payer: COMMERCIAL

## 2025-06-13 ENCOUNTER — TELEPHONE (OUTPATIENT)
Dept: PRIMARY CARE CLINIC | Facility: CLINIC | Age: 40
End: 2025-06-13
Payer: COMMERCIAL

## 2025-06-13 DIAGNOSIS — Z12.31 OTHER SCREENING MAMMOGRAM: Primary | ICD-10-CM
